# Patient Record
Sex: MALE | Race: WHITE | Employment: OTHER | URBAN - METROPOLITAN AREA
[De-identification: names, ages, dates, MRNs, and addresses within clinical notes are randomized per-mention and may not be internally consistent; named-entity substitution may affect disease eponyms.]

---

## 2019-07-30 ENCOUNTER — TRANSFERRED RECORDS (OUTPATIENT)
Dept: HEALTH INFORMATION MANAGEMENT | Facility: CLINIC | Age: 74
End: 2019-07-30

## 2019-09-10 ENCOUNTER — TRANSFERRED RECORDS (OUTPATIENT)
Dept: HEALTH INFORMATION MANAGEMENT | Facility: CLINIC | Age: 74
End: 2019-09-10

## 2019-09-10 ENCOUNTER — MEDICAL CORRESPONDENCE (OUTPATIENT)
Dept: HEALTH INFORMATION MANAGEMENT | Facility: CLINIC | Age: 74
End: 2019-09-10

## 2019-09-12 ENCOUNTER — HOSPITAL ENCOUNTER (OUTPATIENT)
Facility: CLINIC | Age: 74
Setting detail: SPECIMEN
Discharge: HOME | End: 2019-09-12
Attending: INTERNAL MEDICINE | Admitting: INTERNAL MEDICINE
Payer: COMMERCIAL

## 2019-09-12 DIAGNOSIS — C25.9 PANCREATIC CANCER (H): Primary | ICD-10-CM

## 2019-09-12 DIAGNOSIS — C25.9 PANCREATIC CANCER (H): ICD-10-CM

## 2019-09-12 LAB
ABO + RH BLD: NORMAL
ABO + RH BLD: NORMAL
BLD GP AB SCN SERPL QL: NORMAL
BLD PROD TYP BPU: NORMAL
BLOOD BANK CMNT PATIENT-IMP: NORMAL
NUM BPU REQUESTED: 1
SPECIMEN EXP DATE BLD: NORMAL

## 2019-09-12 PROCEDURE — 36415 COLL VENOUS BLD VENIPUNCTURE: CPT

## 2019-09-12 PROCEDURE — 86850 RBC ANTIBODY SCREEN: CPT | Performed by: INTERNAL MEDICINE

## 2019-09-12 PROCEDURE — 86901 BLOOD TYPING SEROLOGIC RH(D): CPT | Performed by: INTERNAL MEDICINE

## 2019-09-12 PROCEDURE — 86900 BLOOD TYPING SEROLOGIC ABO: CPT | Performed by: INTERNAL MEDICINE

## 2019-09-12 PROCEDURE — 86923 COMPATIBILITY TEST ELECTRIC: CPT | Performed by: INTERNAL MEDICINE

## 2019-09-12 RX ORDER — HEPARIN SODIUM,PORCINE 10 UNIT/ML
5 VIAL (ML) INTRAVENOUS
Status: CANCELLED | OUTPATIENT
Start: 2019-09-12

## 2019-09-12 RX ORDER — HEPARIN SODIUM (PORCINE) LOCK FLUSH IV SOLN 100 UNIT/ML 100 UNIT/ML
5 SOLUTION INTRAVENOUS
Status: CANCELLED | OUTPATIENT
Start: 2019-09-12

## 2019-09-12 NOTE — PROGRESS NOTES
Nursing Note:  Rhett Nation presents today for labs.    Patient seen by provider today: No   present during visit today: Not Applicable.    Note: N/A.    Intravenous Access:  Labs drawn without difficulty.    Discharge Plan:   Patient was sent home.  Will RTC tomorrow for PRBC     Yvette Hahn RN, RN

## 2019-09-13 ENCOUNTER — HOSPITAL ENCOUNTER (OUTPATIENT)
Facility: CLINIC | Age: 74
Setting detail: SPECIMEN
End: 2019-09-13
Attending: INTERNAL MEDICINE
Payer: COMMERCIAL

## 2019-09-13 ENCOUNTER — INFUSION THERAPY VISIT (OUTPATIENT)
Dept: INFUSION THERAPY | Facility: CLINIC | Age: 74
End: 2019-09-13
Attending: INTERNAL MEDICINE
Payer: COMMERCIAL

## 2019-09-13 VITALS
OXYGEN SATURATION: 99 % | SYSTOLIC BLOOD PRESSURE: 150 MMHG | HEART RATE: 70 BPM | RESPIRATION RATE: 16 BRPM | TEMPERATURE: 97 F | DIASTOLIC BLOOD PRESSURE: 74 MMHG

## 2019-09-13 DIAGNOSIS — C25.9 PANCREATIC CANCER (H): Primary | ICD-10-CM

## 2019-09-13 LAB
BLD PROD TYP BPU: NORMAL
BLD UNIT ID BPU: 0
BLOOD PRODUCT CODE: NORMAL
BPU ID: NORMAL
TRANSFUSION STATUS PATIENT QL: NORMAL
TRANSFUSION STATUS PATIENT QL: NORMAL

## 2019-09-13 PROCEDURE — P9016 RBC LEUKOCYTES REDUCED: HCPCS

## 2019-09-13 PROCEDURE — 36430 TRANSFUSION BLD/BLD COMPNT: CPT

## 2019-09-13 NOTE — PROGRESS NOTES
Infusion Nursing Note:  Rhett UMAÑA Rios presents today for PRBC.    Patient seen by provider today: No   present during visit today: Not Applicable.    Note: Blood consent signed and to be scanned.    Intravenous Access:  Peripheral IV placed.    Treatment Conditions:  Blood transfusion consent signed 9/13/19.      Post Infusion Assessment:  Patient tolerated infusion without incident.  Blood return noted pre and post infusion.  Site patent and intact, free from redness, edema or discomfort.  No evidence of extravasations.  Access discontinued per protocol.       Discharge Plan:   Patient declined prescription refills.  Discharge instructions reviewed with: Patient.  Patient and/or family verbalized understanding of discharge instructions and all questions answered.  Copy of AVS reviewed with patient and/or family.  Patient will return PRN for next appointment.  Patient discharged in stable condition accompanied by: wife.  Departure Mode: Ambulatory.    Mere Weaver, RN, RN

## 2019-10-21 ENCOUNTER — TRANSFERRED RECORDS (OUTPATIENT)
Dept: HEALTH INFORMATION MANAGEMENT | Facility: CLINIC | Age: 74
End: 2019-10-21

## 2019-10-21 ENCOUNTER — MEDICAL CORRESPONDENCE (OUTPATIENT)
Dept: HEALTH INFORMATION MANAGEMENT | Facility: CLINIC | Age: 74
End: 2019-10-21

## 2019-10-21 ENCOUNTER — HOSPITAL ENCOUNTER (OUTPATIENT)
Dept: LAB | Facility: CLINIC | Age: 74
Discharge: HOME | End: 2019-10-21
Attending: NURSE PRACTITIONER | Admitting: NURSE PRACTITIONER
Payer: COMMERCIAL

## 2019-10-21 DIAGNOSIS — C25.9 PANCREATIC CANCER (H): ICD-10-CM

## 2019-10-21 LAB
ABO + RH BLD: NORMAL
ABO + RH BLD: NORMAL
AST SERPL-CCNC: 17 U/L (ref 10–40)
BLD GP AB SCN SERPL QL: NORMAL
BLD PROD TYP BPU: NORMAL
BLOOD BANK CMNT PATIENT-IMP: NORMAL
CREAT SERPL-MCNC: 1.2 MG/DL (ref 0.73–1.18)
GFR SERPL CREATININE-BSD FRML MDRD: 58 ML/MIN/1.73M2
NUM BPU REQUESTED: 2
POTASSIUM SERPL-SCNC: 3.5 MMOL/L (ref 3.5–5.1)
SPECIMEN EXP DATE BLD: NORMAL

## 2019-10-21 PROCEDURE — 86923 COMPATIBILITY TEST ELECTRIC: CPT | Performed by: NURSE PRACTITIONER

## 2019-10-21 PROCEDURE — 86900 BLOOD TYPING SEROLOGIC ABO: CPT | Performed by: NURSE PRACTITIONER

## 2019-10-21 PROCEDURE — 86850 RBC ANTIBODY SCREEN: CPT | Performed by: NURSE PRACTITIONER

## 2019-10-21 PROCEDURE — 36415 COLL VENOUS BLD VENIPUNCTURE: CPT | Performed by: NURSE PRACTITIONER

## 2019-10-21 PROCEDURE — 86901 BLOOD TYPING SEROLOGIC RH(D): CPT | Performed by: NURSE PRACTITIONER

## 2019-10-21 RX ORDER — HEPARIN SODIUM (PORCINE) LOCK FLUSH IV SOLN 100 UNIT/ML 100 UNIT/ML
5 SOLUTION INTRAVENOUS
Status: CANCELLED | OUTPATIENT
Start: 2019-10-21

## 2019-10-21 RX ORDER — HEPARIN SODIUM,PORCINE 10 UNIT/ML
5 VIAL (ML) INTRAVENOUS
Status: CANCELLED | OUTPATIENT
Start: 2019-10-21

## 2019-10-22 ENCOUNTER — INFUSION THERAPY VISIT (OUTPATIENT)
Dept: INFUSION THERAPY | Facility: CLINIC | Age: 74
End: 2019-10-22
Attending: NURSE PRACTITIONER
Payer: COMMERCIAL

## 2019-10-22 VITALS
HEART RATE: 64 BPM | DIASTOLIC BLOOD PRESSURE: 64 MMHG | RESPIRATION RATE: 16 BRPM | TEMPERATURE: 97.8 F | OXYGEN SATURATION: 99 % | SYSTOLIC BLOOD PRESSURE: 131 MMHG

## 2019-10-22 DIAGNOSIS — C25.9 PANCREATIC CANCER (H): Primary | ICD-10-CM

## 2019-10-22 LAB
BLD PROD TYP BPU: NORMAL
BLD PROD TYP BPU: NORMAL
BLD UNIT ID BPU: 0
BLD UNIT ID BPU: 0
BLOOD PRODUCT CODE: NORMAL
BLOOD PRODUCT CODE: NORMAL
BPU ID: NORMAL
BPU ID: NORMAL
TRANSFUSION STATUS PATIENT QL: NORMAL

## 2019-10-22 PROCEDURE — P9016 RBC LEUKOCYTES REDUCED: HCPCS | Performed by: NURSE PRACTITIONER

## 2019-10-22 PROCEDURE — 36430 TRANSFUSION BLD/BLD COMPNT: CPT

## 2019-10-22 NOTE — PROGRESS NOTES
Infusion Nursing Note:  Rhett Nation presents today for 2 units of PRBC's.    Patient seen by provider today: No   present during visit today: Not Applicable.    Note: N/A.    Intravenous Access:  Peripheral IV placed.    Treatment Conditions:  Hemoglobin 7.1 on 10/21/19.      Post Infusion Assessment:  Patient tolerated infusion without incident.  Blood return noted pre and post infusion.  Site patent and intact, free from redness, edema or discomfort.  No evidence of extravasations.  Access discontinued per protocol.       Discharge Plan:   Discharge instructions reviewed with: Patient and Family.  Patient and/or family verbalized understanding of discharge instructions and all questions answered.  Patient discharged in stable condition accompanied by: wife.  Departure Mode: Ambulatory.    Cait Hicks RN

## 2019-10-29 ENCOUNTER — HOSPITAL ENCOUNTER (INPATIENT)
Facility: CLINIC | Age: 74
LOS: 2 days | Discharge: HOME | DRG: 378 | End: 2019-10-31
Attending: EMERGENCY MEDICINE | Admitting: INTERNAL MEDICINE
Payer: COMMERCIAL

## 2019-10-29 ENCOUNTER — APPOINTMENT (OUTPATIENT)
Dept: GENERAL RADIOLOGY | Facility: CLINIC | Age: 74
DRG: 378 | End: 2019-10-29
Attending: EMERGENCY MEDICINE
Payer: COMMERCIAL

## 2019-10-29 DIAGNOSIS — D72.829 LEUKOCYTOSIS, UNSPECIFIED TYPE: ICD-10-CM

## 2019-10-29 DIAGNOSIS — D69.6 THROMBOCYTOPENIA (H): ICD-10-CM

## 2019-10-29 DIAGNOSIS — K92.2 GASTROINTESTINAL HEMORRHAGE, UNSPECIFIED GASTROINTESTINAL HEMORRHAGE TYPE: ICD-10-CM

## 2019-10-29 DIAGNOSIS — D64.9 ANEMIA, UNSPECIFIED TYPE: ICD-10-CM

## 2019-10-29 DIAGNOSIS — C25.9 MALIGNANT NEOPLASM OF PANCREAS, UNSPECIFIED LOCATION OF MALIGNANCY (H): ICD-10-CM

## 2019-10-29 DIAGNOSIS — K92.1 MELENA: ICD-10-CM

## 2019-10-29 LAB
ALBUMIN SERPL-MCNC: 2.5 G/DL (ref 3.4–5)
ALP SERPL-CCNC: 153 U/L (ref 40–150)
ALT SERPL W P-5'-P-CCNC: 28 U/L (ref 0–70)
ANION GAP SERPL CALCULATED.3IONS-SCNC: 7 MMOL/L (ref 3–14)
AST SERPL W P-5'-P-CCNC: 21 U/L (ref 0–45)
BASOPHILS # BLD AUTO: 0.1 10E9/L (ref 0–0.2)
BASOPHILS NFR BLD AUTO: 0.2 %
BILIRUB SERPL-MCNC: 0.5 MG/DL (ref 0.2–1.3)
BLD PROD TYP BPU: NORMAL
BLD PROD TYP BPU: NORMAL
BLD UNIT ID BPU: 0
BLD UNIT ID BPU: 0
BLOOD PRODUCT CODE: NORMAL
BLOOD PRODUCT CODE: NORMAL
BPU ID: NORMAL
BPU ID: NORMAL
BUN SERPL-MCNC: 43 MG/DL (ref 7–30)
CALCIUM SERPL-MCNC: 8.1 MG/DL (ref 8.5–10.1)
CHLORIDE SERPL-SCNC: 110 MMOL/L (ref 94–109)
CO2 SERPL-SCNC: 27 MMOL/L (ref 20–32)
CREAT SERPL-MCNC: 0.99 MG/DL (ref 0.66–1.25)
DIFFERENTIAL METHOD BLD: ABNORMAL
EOSINOPHIL # BLD AUTO: 0.1 10E9/L (ref 0–0.7)
EOSINOPHIL NFR BLD AUTO: 0.2 %
ERYTHROCYTE [DISTWIDTH] IN BLOOD BY AUTOMATED COUNT: 14.6 % (ref 10–15)
GFR SERPL CREATININE-BSD FRML MDRD: 74 ML/MIN/{1.73_M2}
GLUCOSE BLDC GLUCOMTR-MCNC: 163 MG/DL (ref 70–99)
GLUCOSE BLDC GLUCOMTR-MCNC: 178 MG/DL (ref 70–99)
GLUCOSE SERPL-MCNC: 207 MG/DL (ref 70–99)
HCT VFR BLD AUTO: 18.3 % (ref 40–53)
HEMOCCULT STL QL: POSITIVE
HGB BLD-MCNC: 5.7 G/DL (ref 13.3–17.7)
HGB BLD-MCNC: 5.9 G/DL (ref 13.3–17.7)
IMM GRANULOCYTES # BLD: 0.9 10E9/L (ref 0–0.4)
IMM GRANULOCYTES NFR BLD: 3.5 %
INR PPP: 1.27 (ref 0.86–1.14)
LACTATE BLD-SCNC: 1.3 MMOL/L (ref 0.7–2)
LACTATE BLD-SCNC: 3 MMOL/L (ref 0.7–2)
LACTATE BLD-SCNC: 4.6 MMOL/L (ref 0.7–2)
LYMPHOCYTES # BLD AUTO: 2.3 10E9/L (ref 0.8–5.3)
LYMPHOCYTES NFR BLD AUTO: 9.1 %
MCH RBC QN AUTO: 30 PG (ref 26.5–33)
MCHC RBC AUTO-ENTMCNC: 31.1 G/DL (ref 31.5–36.5)
MCV RBC AUTO: 96 FL (ref 78–100)
MONOCYTES # BLD AUTO: 1.2 10E9/L (ref 0–1.3)
MONOCYTES NFR BLD AUTO: 5 %
NEUTROPHILS # BLD AUTO: 20.3 10E9/L (ref 1.6–8.3)
NEUTROPHILS NFR BLD AUTO: 82 %
NRBC # BLD AUTO: 0.1 10*3/UL
NRBC BLD AUTO-RTO: 0 /100
PLATELET # BLD AUTO: 91 10E9/L (ref 150–450)
POTASSIUM SERPL-SCNC: 4 MMOL/L (ref 3.4–5.3)
PROT SERPL-MCNC: 5.6 G/DL (ref 6.8–8.8)
RBC # BLD AUTO: 1.9 10E12/L (ref 4.4–5.9)
SODIUM SERPL-SCNC: 144 MMOL/L (ref 133–144)
TRANSFUSION STATUS PATIENT QL: NORMAL
TROPONIN I SERPL-MCNC: <0.015 UG/L (ref 0–0.04)
WBC # BLD AUTO: 24.8 10E9/L (ref 4–11)

## 2019-10-29 PROCEDURE — 96365 THER/PROPH/DIAG IV INF INIT: CPT

## 2019-10-29 PROCEDURE — 93005 ELECTROCARDIOGRAM TRACING: CPT

## 2019-10-29 PROCEDURE — 84484 ASSAY OF TROPONIN QUANT: CPT | Performed by: EMERGENCY MEDICINE

## 2019-10-29 PROCEDURE — 87040 BLOOD CULTURE FOR BACTERIA: CPT | Performed by: EMERGENCY MEDICINE

## 2019-10-29 PROCEDURE — 99223 1ST HOSP IP/OBS HIGH 75: CPT | Mod: AI | Performed by: INTERNAL MEDICINE

## 2019-10-29 PROCEDURE — 99207 ZZC CDG-CHARGE REQUIRED MANUAL ENTRY: CPT | Performed by: INTERNAL MEDICINE

## 2019-10-29 PROCEDURE — 83605 ASSAY OF LACTIC ACID: CPT | Performed by: PHYSICIAN ASSISTANT

## 2019-10-29 PROCEDURE — 96361 HYDRATE IV INFUSION ADD-ON: CPT

## 2019-10-29 PROCEDURE — 85018 HEMOGLOBIN: CPT | Performed by: PHYSICIAN ASSISTANT

## 2019-10-29 PROCEDURE — 96375 TX/PRO/DX INJ NEW DRUG ADDON: CPT

## 2019-10-29 PROCEDURE — 96360 HYDRATION IV INFUSION INIT: CPT | Mod: 59

## 2019-10-29 PROCEDURE — 85610 PROTHROMBIN TIME: CPT | Performed by: EMERGENCY MEDICINE

## 2019-10-29 PROCEDURE — 86850 RBC ANTIBODY SCREEN: CPT | Performed by: EMERGENCY MEDICINE

## 2019-10-29 PROCEDURE — 80053 COMPREHEN METABOLIC PANEL: CPT | Performed by: EMERGENCY MEDICINE

## 2019-10-29 PROCEDURE — P9016 RBC LEUKOCYTES REDUCED: HCPCS | Performed by: EMERGENCY MEDICINE

## 2019-10-29 PROCEDURE — 00000146 ZZHCL STATISTIC GLUCOSE BY METER IP

## 2019-10-29 PROCEDURE — 25000132 ZZH RX MED GY IP 250 OP 250 PS 637: Performed by: PHYSICIAN ASSISTANT

## 2019-10-29 PROCEDURE — 85025 COMPLETE CBC W/AUTO DIFF WBC: CPT | Performed by: EMERGENCY MEDICINE

## 2019-10-29 PROCEDURE — 83605 ASSAY OF LACTIC ACID: CPT | Performed by: EMERGENCY MEDICINE

## 2019-10-29 PROCEDURE — 12000000 ZZH R&B MED SURG/OB

## 2019-10-29 PROCEDURE — 86901 BLOOD TYPING SEROLOGIC RH(D): CPT | Performed by: EMERGENCY MEDICINE

## 2019-10-29 PROCEDURE — 86923 COMPATIBILITY TEST ELECTRIC: CPT | Performed by: EMERGENCY MEDICINE

## 2019-10-29 PROCEDURE — 25000128 H RX IP 250 OP 636: Performed by: PHYSICIAN ASSISTANT

## 2019-10-29 PROCEDURE — 71045 X-RAY EXAM CHEST 1 VIEW: CPT

## 2019-10-29 PROCEDURE — C9113 INJ PANTOPRAZOLE SODIUM, VIA: HCPCS | Performed by: EMERGENCY MEDICINE

## 2019-10-29 PROCEDURE — 25000131 ZZH RX MED GY IP 250 OP 636 PS 637: Performed by: PHYSICIAN ASSISTANT

## 2019-10-29 PROCEDURE — 99207 ZZC APP CREDIT; MD BILLING SHARED VISIT: CPT | Performed by: PHYSICIAN ASSISTANT

## 2019-10-29 PROCEDURE — 82272 OCCULT BLD FECES 1-3 TESTS: CPT | Performed by: EMERGENCY MEDICINE

## 2019-10-29 PROCEDURE — 25800030 ZZH RX IP 258 OP 636: Performed by: EMERGENCY MEDICINE

## 2019-10-29 PROCEDURE — 25000128 H RX IP 250 OP 636: Performed by: EMERGENCY MEDICINE

## 2019-10-29 PROCEDURE — 25800030 ZZH RX IP 258 OP 636: Performed by: PHYSICIAN ASSISTANT

## 2019-10-29 PROCEDURE — C9113 INJ PANTOPRAZOLE SODIUM, VIA: HCPCS | Performed by: PHYSICIAN ASSISTANT

## 2019-10-29 PROCEDURE — 36430 TRANSFUSION BLD/BLD COMPNT: CPT

## 2019-10-29 PROCEDURE — 99285 EMERGENCY DEPT VISIT HI MDM: CPT | Mod: 25

## 2019-10-29 PROCEDURE — 86900 BLOOD TYPING SEROLOGIC ABO: CPT | Performed by: EMERGENCY MEDICINE

## 2019-10-29 PROCEDURE — 36415 COLL VENOUS BLD VENIPUNCTURE: CPT | Performed by: PHYSICIAN ASSISTANT

## 2019-10-29 RX ORDER — AMPICILLIN AND SULBACTAM 2; 1 G/1; G/1
3 INJECTION, POWDER, FOR SOLUTION INTRAMUSCULAR; INTRAVENOUS ONCE
Status: DISCONTINUED | OUTPATIENT
Start: 2019-10-29 | End: 2019-10-29

## 2019-10-29 RX ORDER — PROCHLORPERAZINE 25 MG
12.5 SUPPOSITORY, RECTAL RECTAL EVERY 12 HOURS PRN
Status: DISCONTINUED | OUTPATIENT
Start: 2019-10-29 | End: 2019-10-31 | Stop reason: HOSPADM

## 2019-10-29 RX ORDER — GABAPENTIN 100 MG/1
300 CAPSULE ORAL 3 TIMES DAILY
COMMUNITY

## 2019-10-29 RX ORDER — LIDOCAINE/PRILOCAINE 2.5 %-2.5%
CREAM (GRAM) TOPICAL PRN
COMMUNITY

## 2019-10-29 RX ORDER — SODIUM CHLORIDE, SODIUM LACTATE, POTASSIUM CHLORIDE, CALCIUM CHLORIDE 600; 310; 30; 20 MG/100ML; MG/100ML; MG/100ML; MG/100ML
1000 INJECTION, SOLUTION INTRAVENOUS CONTINUOUS
Status: DISCONTINUED | OUTPATIENT
Start: 2019-10-29 | End: 2019-10-30

## 2019-10-29 RX ORDER — ASPIRIN 81 MG/1
81 TABLET ORAL DAILY
Status: ON HOLD | COMMUNITY
End: 2019-10-30

## 2019-10-29 RX ORDER — ATORVASTATIN CALCIUM 20 MG/1
20 TABLET, FILM COATED ORAL DAILY
COMMUNITY

## 2019-10-29 RX ORDER — DEXTROSE MONOHYDRATE 25 G/50ML
25-50 INJECTION, SOLUTION INTRAVENOUS
Status: DISCONTINUED | OUTPATIENT
Start: 2019-10-29 | End: 2019-10-31 | Stop reason: HOSPADM

## 2019-10-29 RX ORDER — LISINOPRIL 20 MG/1
20 TABLET ORAL DAILY
Status: ON HOLD | COMMUNITY
End: 2019-10-30

## 2019-10-29 RX ORDER — GLIPIZIDE 10 MG/1
10 TABLET, FILM COATED, EXTENDED RELEASE ORAL DAILY
Status: ON HOLD | COMMUNITY
End: 2019-10-30

## 2019-10-29 RX ORDER — LIDOCAINE 40 MG/G
CREAM TOPICAL DAILY PRN
Status: DISCONTINUED | OUTPATIENT
Start: 2019-10-29 | End: 2019-10-31 | Stop reason: HOSPADM

## 2019-10-29 RX ORDER — GABAPENTIN 300 MG/1
300 CAPSULE ORAL 3 TIMES DAILY
Status: DISCONTINUED | OUTPATIENT
Start: 2019-10-29 | End: 2019-10-31 | Stop reason: HOSPADM

## 2019-10-29 RX ORDER — NALOXONE HYDROCHLORIDE 0.4 MG/ML
.1-.4 INJECTION, SOLUTION INTRAMUSCULAR; INTRAVENOUS; SUBCUTANEOUS
Status: DISCONTINUED | OUTPATIENT
Start: 2019-10-29 | End: 2019-10-31 | Stop reason: HOSPADM

## 2019-10-29 RX ORDER — LIDOCAINE 40 MG/G
CREAM TOPICAL
Status: DISCONTINUED | OUTPATIENT
Start: 2019-10-29 | End: 2019-10-31 | Stop reason: HOSPADM

## 2019-10-29 RX ORDER — FUROSEMIDE 20 MG
20 TABLET ORAL DAILY
COMMUNITY

## 2019-10-29 RX ORDER — SODIUM CHLORIDE, SODIUM LACTATE, POTASSIUM CHLORIDE, CALCIUM CHLORIDE 600; 310; 30; 20 MG/100ML; MG/100ML; MG/100ML; MG/100ML
1000 INJECTION, SOLUTION INTRAVENOUS CONTINUOUS
Status: DISCONTINUED | OUTPATIENT
Start: 2019-10-29 | End: 2019-10-29

## 2019-10-29 RX ORDER — LISINOPRIL AND HYDROCHLOROTHIAZIDE 12.5; 2 MG/1; MG/1
1 TABLET ORAL DAILY
Status: ON HOLD | COMMUNITY
End: 2019-10-30

## 2019-10-29 RX ORDER — AMLODIPINE BESYLATE 5 MG/1
5 TABLET ORAL DAILY
Status: ON HOLD | COMMUNITY
End: 2019-10-30

## 2019-10-29 RX ORDER — NICOTINE POLACRILEX 4 MG
15-30 LOZENGE BUCCAL
Status: DISCONTINUED | OUTPATIENT
Start: 2019-10-29 | End: 2019-10-31 | Stop reason: HOSPADM

## 2019-10-29 RX ORDER — LISINOPRIL 20 MG/1
20 TABLET ORAL DAILY
Status: DISCONTINUED | OUTPATIENT
Start: 2019-10-29 | End: 2019-10-29

## 2019-10-29 RX ORDER — ATORVASTATIN CALCIUM 20 MG/1
20 TABLET, FILM COATED ORAL DAILY
Status: DISCONTINUED | OUTPATIENT
Start: 2019-10-29 | End: 2019-10-31 | Stop reason: HOSPADM

## 2019-10-29 RX ORDER — ONDANSETRON 2 MG/ML
4 INJECTION INTRAMUSCULAR; INTRAVENOUS EVERY 6 HOURS PRN
Status: DISCONTINUED | OUTPATIENT
Start: 2019-10-29 | End: 2019-10-31 | Stop reason: HOSPADM

## 2019-10-29 RX ORDER — ATENOLOL 100 MG/1
100 TABLET ORAL DAILY
COMMUNITY

## 2019-10-29 RX ORDER — ONDANSETRON 4 MG/1
4 TABLET, ORALLY DISINTEGRATING ORAL EVERY 6 HOURS PRN
Status: DISCONTINUED | OUTPATIENT
Start: 2019-10-29 | End: 2019-10-31 | Stop reason: HOSPADM

## 2019-10-29 RX ORDER — SODIUM CHLORIDE, SODIUM LACTATE, POTASSIUM CHLORIDE, CALCIUM CHLORIDE 600; 310; 30; 20 MG/100ML; MG/100ML; MG/100ML; MG/100ML
INJECTION, SOLUTION INTRAVENOUS CONTINUOUS
Status: DISCONTINUED | OUTPATIENT
Start: 2019-10-29 | End: 2019-10-29

## 2019-10-29 RX ORDER — FUROSEMIDE 20 MG
20 TABLET ORAL DAILY PRN
COMMUNITY

## 2019-10-29 RX ORDER — PROCHLORPERAZINE MALEATE 5 MG
5 TABLET ORAL EVERY 6 HOURS PRN
Status: DISCONTINUED | OUTPATIENT
Start: 2019-10-29 | End: 2019-10-31 | Stop reason: HOSPADM

## 2019-10-29 RX ADMIN — AMPICILLIN SODIUM AND SULBACTAM SODIUM 3 G: 2; 1 INJECTION, POWDER, FOR SOLUTION INTRAMUSCULAR; INTRAVENOUS at 15:08

## 2019-10-29 RX ADMIN — PANTOPRAZOLE SODIUM 40 MG: 40 INJECTION, POWDER, FOR SOLUTION INTRAVENOUS at 12:41

## 2019-10-29 RX ADMIN — INSULIN ASPART 1 UNITS: 100 INJECTION, SOLUTION INTRAVENOUS; SUBCUTANEOUS at 21:46

## 2019-10-29 RX ADMIN — SODIUM CHLORIDE 1000 ML: 9 INJECTION, SOLUTION INTRAVENOUS at 11:41

## 2019-10-29 RX ADMIN — INSULIN ASPART 1 UNITS: 100 INJECTION, SOLUTION INTRAVENOUS; SUBCUTANEOUS at 18:48

## 2019-10-29 RX ADMIN — SODIUM CHLORIDE, POTASSIUM CHLORIDE, SODIUM LACTATE AND CALCIUM CHLORIDE 1000 ML: 600; 310; 30; 20 INJECTION, SOLUTION INTRAVENOUS at 15:08

## 2019-10-29 RX ADMIN — ATORVASTATIN CALCIUM 20 MG: 20 TABLET, FILM COATED ORAL at 17:54

## 2019-10-29 RX ADMIN — SODIUM CHLORIDE, POTASSIUM CHLORIDE, SODIUM LACTATE AND CALCIUM CHLORIDE 1000 ML: 600; 310; 30; 20 INJECTION, SOLUTION INTRAVENOUS at 14:05

## 2019-10-29 RX ADMIN — GABAPENTIN 300 MG: 300 CAPSULE ORAL at 21:45

## 2019-10-29 RX ADMIN — GABAPENTIN 300 MG: 300 CAPSULE ORAL at 18:52

## 2019-10-29 RX ADMIN — SODIUM CHLORIDE, POTASSIUM CHLORIDE, SODIUM LACTATE AND CALCIUM CHLORIDE 1000 ML: 600; 310; 30; 20 INJECTION, SOLUTION INTRAVENOUS at 17:44

## 2019-10-29 RX ADMIN — PANTOPRAZOLE SODIUM 8 MG/HR: 40 INJECTION, POWDER, FOR SOLUTION INTRAVENOUS at 19:03

## 2019-10-29 ASSESSMENT — ENCOUNTER SYMPTOMS
ABDOMINAL PAIN: 0
LIGHT-HEADEDNESS: 1
ROS GI COMMENTS: BLOOD IN VOMIT
BLOOD IN STOOL: 1
SHORTNESS OF BREATH: 1

## 2019-10-29 ASSESSMENT — ACTIVITIES OF DAILY LIVING (ADL): ADLS_ACUITY_SCORE: 21

## 2019-10-29 NOTE — H&P
[facility]    History and Physical  Hospitalist       Date of Admission:  10/29/2019    Assessment & Plan   Rhett Nation is a 74 year old male with known history of pancreatic adenocarcinoma (relatively recent diagnosis 4/2019), insulin-dependent diabetes mellitus, hypertension, hyperlipidemia who presented after an episode of blood in his stool and emesis early this morning concerning for Upper GI bleed.    Spoke with Dr. Hoffman in the ED, full chart review including lab work, imaging, and vital signs were reviewed. Patient received 2 units PRBC in the ED, Dr. Hoffman spoke with GI who recommended consult.   The patient was admitted to inpatient status for further work-up and monitoring.      Melena  Patient symptomatic presents with episode of melena, recurred in ED.   Laboratory studies remarkable for BUN 43, concerning for upper GI bleed. The patient has an INR 1.5, is not on any blood thinners thought to be in the setting of poor nutritional status.  Most recent sigmoid endoscopy with colonic placement of endoscopic stent to relieve stricture 4/2019.  No prior history of GI bleeds, or excessive NSAID use.  -Continue transfusion of PRBCs with hemoglobin rechecks  -Appreciate consultation with GI  -Occult blood stool pending  -IV Protonix drip  -NPO status         Anemia, likely multifactorial in the setting of recent chemotherapy treatment and suspected GI bleeding  The patient recently underwent a round of chemotherapy with agents including Abraxane and Gemzar, this was on 10/21.  He received 2 units of blood on 10/22 for anemia induced by chemotherapy hemoglobin around this time 7.1. On admission today he was symptomatic, with a hemoglobin of 5.7  -Continue transfusion of PRBCs for Hgb <7 with hemoglobin rechecks  -Consider consultation with Hematology/Oncology in the future regarding recommendations         Elevated lactate differential includes stress, suspect dehydration versus sepsis  "  Patient is afebrile, however notes that he has been \"clammy feeling\" lately.  No active source of infection identified.  However with a white blood cell count of 24.8 and lactate of 4.6, further work-up was warranted.  Suspect elevated white count in part due to stress. chest x-ray negative for infiltrate.  Inspection of lower extremities revealed a small edematous ulcer on the medial aspect of his right lower extremity, however I do not suspect cellulitis component at this time. IV Unasyn started in the emergency department was discontinued.    -IV fluids  -Obtain urinalysis to rule out source of infection  -Blood culture x1 obtained, patient refused second  -Repeat Lactate after further fluid resuscitation      Hypoxia suspect etiology due to hemoglobin of 5.7  Patient does not have a history of pulmonary disease or heart failure. He is not on oxygen at home.  He does not have a productive cough or reported fevers. The patient denies chest pain. Chest x-ray obtained without infiltrate or effusion.  -Telemetry monitoring  -Supplemental oxygen as needed for saturations under 92%      History of pancreatic adenocarcinoma, deemed local advanced unresectable   This is a relatively new diagnosis for the patient as he was diagnosed in April 2019 with locally advanced unresectable pancreatic adenocarcinoma.  He follows with Dr. Moore in the [facility] system recently underwent a round of chemotherapy agents include Abraxane and Gemzar this was on 10/21.   -Consider oncology consult if warranted      History of insulin-dependent type 2 diabetes mellitus   The patient has been taking his own regimen of Tresiba based on his morning blood sugars which has been about 18 units in the morning.   -Hold oral metformin, Glipizide  -Given NPO status initiate sliding scale insulin       Chronic bilateral lower extremity edema   The patient has been evaluated with Doppler lower extremity ultrasound without DVT 07/2019.  Takes " "furosemide 20 mg as needed for the leg swelling.  There is 1 small ulceration on the right medial lower extremity did not does not appear to celulitic in appearnace at this time  -Outline area to monitor  -Consider PT orders for lymphedema   -Hold Lasix at this time given dehydrated status      History of hypertension   Hold home oral medications      History of hyperlipidemia   Resume Lipitor 20 mg once a day      DVT Prophylaxis: Pneumatic Compression Devices  Code Status: DNR / DNI  Expected discharge: 2 - 3 days, recommended to prior living arrangement once tolerating p.o., hemoglobin stable      Frida Dallas PA-C    Primary Care Physician   *Prince Alexander    Chief Complaint   Melena    History is obtained from the patient, chart review, patient's wife Melinda who was present for interview.       History of Present Illness   Rhett Nation is a 74 year old male with history of locally advanced unresectable pancreatic adenocarcinoma, insulin-dependent diabetes mellitus, hypertension, hyperlipidemia who presents with weakness and blood in his stool and emesis.   He was brought to the emergency department by his wife this morning after waking up with an episode of blood in his emesis and diarrhea in the bathroom this morning.  His vomit and diarrhea was dark in color described as \"blueberry like\".  The symptoms were not accompanied with abdominal pain. He notes that he was feeling clammy and chills. He has had diarrhea off and on since starting chemotherapy treatments.  Most recently 10/21 where he received a infusion of Abraxane and Gemzar.  He was told to take Imodium for his diarrhea, as this is a reaction with his chemotherapy.  Today he took Imodium which resolved the diarrhea.  Had not any further episodes of emesis.  He denies a history of GI bleeds.  He notes that he had an episode in the past month where there was a spot of red blood in his stool and he thought it was due to a hemorrhoid, but no " further episodes of this.  He has not had any vomiting other than the one episode early this morning that looked like it had blood in it.  He additionally notes that he has had a hard time swallowing and has a global sensation with eating since having the biopsy done on his pancreas in April 2019.  Denies epistaxis. He does report an overall low appetite.   He reports that this morning he has felt extremely weak and unsteady. He notes that he has been feeling more tired lately since his recent chemotherapy treatments however today he could hardly get to the couch from the bathroom.    He has felt short of breath due to feeling so weak lately.  Is vague about when this started.  Cannot specify any triggers, is not more short of breath when lying flat.  Has chronic baseline lower extremity edema, notes that his legs have not been any more swollen than usual.    He has had not not had any blood in his urine but notes that it has been darker in color.  Denies dysuria.  No new changes in diet, however he reports that he may have felt worse after having a bottle of repair last night.      No chest pain, syncope, dizziness. No vision changes.     Past Medical History    I have reviewed this patient's medical history and updated it with pertinent information if needed.     Past Surgical History   I have reviewed this patient's surgical history and updated it with pertinent information if needed.      Prior to Admission Medications   Prior to Admission Medications   Prescriptions Last Dose Informant Patient Reported? Taking?   aspirin 81 MG EC tablet 10/28/2019 at Unknown time  Yes Yes   Sig: Take 81 mg by mouth daily   glipiZIDE (GLUCOTROL XL) 10 MG 24 hr tablet 10/28/2019 at Unknown time  Yes Yes   Sig: Take 10 mg by mouth daily   metFORMIN (GLUCOPHAGE) 1000 MG tablet   Yes Yes   Sig: Take 1,000 mg by mouth 2 times daily (with meals)      Facility-Administered Medications: None     Allergies   No Known Allergies    Social  History   The patient is a retired .  He lives independently with his wife.    Family History   Reviewed, noncontributory    Review of Systems   The 10 point Review of Systems is negative other than noted in the HPI or here.     Physical Exam   Temp: 98.5  F (36.9  C) Temp src: Oral BP: 118/63 Pulse: 82 Heart Rate: 81 Resp: 17 SpO2: 100 % O2 Device: None (Room air)    Vital Signs with Ranges  Temp:  [98.4  F (36.9  C)-98.5  F (36.9  C)] 98.5  F (36.9  C)  Pulse:  [77-92] 82  Heart Rate:  [76-84] 81  Resp:  [10-20] 17  BP: (104-133)/(58-66) 118/63  SpO2:  [96 %-100 %] 100 %  0 lbs 0 oz    Constitutional: Awake, alert, cooperative, no apparent distress.  Eyes: Conjunctiva and pupils examined and normal.  HEENT: Dry mucous membranes, normal dentition.  Respiratory: Clear to auscultation bilaterally, no crackles or wheezing.  Cardiovascular: Regular rate and rhythm, normal S1 and S2, and no murmur noted.  GI: Soft, non-distended, non-tender, bowel sounds present.  Lymph/Hematologic: No anterior cervical or supraclavicular adenopathy.  Skin: Patient is significantly pale in color. Right-sided via her port in place without surrounding erythema or induration. No rashes.  Patient  Musculoskeletal: No joint swelling, erythema or tenderness.  Neurologic: Cranial nerves grossly 2-12 intact, normal strength and sensation. Moving all extremities.  Psychiatric: Alert, oriented to person, place and time, no obvious anxiety or depression.    Data   Data reviewed today:  I personally reviewed the EKG tracing showing Sinus rhythym without acute ST changes..  Recent Labs   Lab 10/29/19  1139   WBC 24.8*   HGB 5.7*   MCV 96   PLT 91*   INR 1.27*      POTASSIUM 4.0   CHLORIDE 110*   CO2 27   BUN 43*   CR 0.99   ANIONGAP 7   CLAIRE 8.1*   *   ALBUMIN 2.5*   PROTTOTAL 5.6*   BILITOTAL 0.5   ALKPHOS 153*   ALT 28   AST 21   TROPI <0.015       Recent Results (from the past 24 hour(s))   XR Chest Port 1 View    Narrative     CHEST ONE VIEW PORTABLE October 29, 2019 2:05 PM     HISTORY: Chest.    COMPARISON: None.      Impression    IMPRESSION: Right-sided Port-A-Cath. The lungs are clear. No  pneumothorax or pleural effusion.

## 2019-10-29 NOTE — ED NOTES
Patient refusing port to be accessed. Patient was offered numbing cream but is still refusing and would like a peripheral IV placed.

## 2019-10-29 NOTE — ED NOTES
[facility]  ED Nurse Handoff Report    Rhett Nation is a 74 year old male   ED Chief complaint: Hematemesis and Rectal Bleeding  . ED Diagnosis:   Final diagnoses:   Anemia, unspecified type     Allergies: No Known Allergies    Code Status: Not on file   Activity level - Baseline/Home:  Independent. Activity Level - Current:   Assist X 1. Lift room needed: No. Bariatric: No   Needed: No   Isolation: No. Infection: Not Applicable.     Vital Signs:   Vitals:    10/29/19 1415 10/29/19 1445 10/29/19 1500 10/29/19 1515   BP: (!) 142/76  (!) 146/72 (!) 144/76   Pulse: 81 102 85 84   Resp: 13 22  20   Temp:       TempSrc:       SpO2: 100% 100%  100%       Cardiac Rhythm:  ,      Pain level:    Patient confused: No. Patient Falls Risk: Yes.   Elimination Status: Has not voided yet   Patient Report - Initial Complaint: Hematemesis , rectal bleeding . Focused Assessment: Rhett Nation is a 74 year old male with a history of HTN, HLD, DM2, pancreatic cancer, and splenic vein thrombosis who presents to the emergency department for evaluation of blood in stool. The patient reports he went to bed last night feeling normal, and he woke up at 0200 passing dark and red blood in his stool. He further reports he had one episode of vomiting blood. The patient states he had chest pain, lightheadedness, and shortness of breath accompanying these symptoms. Upon presentation in the ED, the patient reports the chest pain has subsided, but he still feels short of breath has has become abnormally pale. He denies abdominal pain. The patient notes he has never experienced similar symptoms in the past. He indicates he is a current tobacco smoker. Of note, the patient received chemotherapy for pancreatic cancer on 10/21 and had a blood transfusion on 10/22 secondary to low hemoglobin levels. The patient also had a CT scan on 10/26, results below.   Patient was seen by oncology clinic Savanna Hayes on 10/21 and clinic  noted below.     CT Chest Abd Pelvis W IV Cont, 10/26:.  1. Questionable slight decrease in size of the pancreatic tail mass.  2. No definitive evidence of worsening metastatic disease.  3. Left lower lobe lung opacities are unchanged. Attention on follow-up.  4. Probable portal venous thrombosis within peripheral branches of the right portal vein appears similar.  5. Hypoattenuating lesion measuring approximately 2.0 cm right hepatic lobe is unchanged.  6. 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  As per radiology.     Savanna Hayes, APRN, CNP - 10/21/2019 10:30 AM CDT  Formatting of this note might be different from the original.  HEMATOLOGY/ONCOLOGY CLINICIAN VISIT    NAME: Rhett Nation  : 1945  MRN:[id number]   CSN: [id number]    DATE OF ENCOUNTER: 10/21/2019    REASON FOR VISIT: Re-initiated Abraxane/gemcitabine    PRIMARY ONCOLOGIST: Delfina Moore MD    DIAGNOSIS: Stage IIA, locally advanced unresectable pancreatic adenocarcinoma    ONCOLOGIC HISTORY:   In brief, Rhett nation is a 73-year-old male with recent diagnosis locally advanced pancreatic cancer, admitted to the hospital on 2019 with abdominal pain and anorexia.  Ultrasound on 2019 demonstrated pancreatic tail lesion and a follow-up CT demonstrated a 4.4 cm mass between the tail of the pancreas and splenic hilum causing high-grade obstruction of the splenic flexure of the colon.  He subsequently underwent an endoscopic ultrasound with FNA of the tail of the pancreas lesion which was consistent with adenocarcinoma.  Aspiration of ascitic fluid was negative for malignancy.  He declined colon resection and underwent a stent on 2019.  His mass was deemed inoperable and he began palliative chemotherapy with gemcitabine and Abraxane. His course has been complicated by significant weakness, fatigue, and diarrhea. His chemotherapy has been  hold, but given his rising CA 19 9, he presents today to reinitiate chemotherapy    INTERIM HISTORY:  Rhett presents today with his wife. He has states he has been in his usual state of health. Does have ongoing weakness, but feels better since recovering from chemotherapy. Prior while on chemotherapy, he he denied any nausea vomiting but did have significant fatigue, anorexia, and weakness, which did result in 1 fall. He was able to mow his lawn with a riding . He does have exertional dyspnea and has been slightly hypotensive, with a systolic blood pressure down to 100. He states that generally his weight has been stable. He has had intermittent dizziness upon getting up. His hemoglobin is down today at 7.1 and was last transfused with packed red blood cells in September 2019. He tolerated the transfusion well. Otherwise, he has been afebrile. He has had some intermittent chills at times. He denies any nausea or vomiting. He continues to have ongoing intermittent diarrhea, which is stable. He uses Imodium, which does resolve his symptoms. He denies any mucositis. He denies any abnormal bleeding or bruising tendencies.      11:38 Gastrointestinal Gastrointestinal - GI WDL: -WDL except; GI symptoms; nausea and vomiting; stool  Nausea/Vomiting Signs/Symptoms: emesis; nausea intermittent (Emesis is dark colored) Stool Color: black  GI Signs/Symptoms: nausea; vomiting  AD     11:38 Respiratory Respiratory - Respiratory WDL: -WDL except (Shortness of breath) AD     11:38 Musculoskeletal Musculoskeletal - General Mobility: generalized weakness  AD        Tests Performed: EKG, Blood Work. Abnormal Results:   Labs Ordered and Resulted from Time of ED Arrival Up to the Time of Departure from the ED   CBC WITH PLATELETS DIFFERENTIAL - Abnormal; Notable for the following components:       Result Value    WBC 24.8 (*)     RBC Count 1.90 (*)     Hemoglobin 5.7 (*)     Hematocrit 18.3 (*)     MCHC 31.1 (*)      Platelet Count 91 (*)     Absolute Neutrophil 20.3 (*)     Abs Immature Granulocytes 0.9 (*)     All other components within normal limits   INR - Abnormal; Notable for the following components:    INR 1.27 (*)     All other components within normal limits   COMPREHENSIVE METABOLIC PANEL - Abnormal; Notable for the following components:    Chloride 110 (*)     Glucose 207 (*)     Urea Nitrogen 43 (*)     Calcium 8.1 (*)     Albumin 2.5 (*)     Protein Total 5.6 (*)     Alkaline Phosphatase 153 (*)     All other components within normal limits   LACTIC ACID WHOLE BLOOD - Abnormal; Notable for the following components:    Lactic Acid 4.6 (*)     All other components within normal limits   OCCULT BLOOD STOOL - Abnormal; Notable for the following components:    Occult Blood Positive (*)     All other components within normal limits   LACTIC ACID WHOLE BLOOD - Abnormal; Notable for the following components:    Lactic Acid 3.0 (*)     All other components within normal limits   TROPONIN I   ROUTINE UA WITH MICROSCOPIC   PERIPHERAL IV CATHETER   ABO/RH TYPE AND SCREEN   RED BLOOD CELL PREPARE ORDER UNIT   BLOOD COMPONENT   BLOOD COMPONENT   BLOOD CULTURE   BLOOD CULTURE     No orders to display       Treatments provided: Blood Transfusion (will receive a total of 2 units of PRBC)  Family Comments: James Lawrence in room  OBS brochure/video discussed/provided to patient:  TBD  ED Medications:   Medications   lactated ringers infusion (1,000 mLs Intravenous New Bag 10/29/19 1508)   ampicillin-sulbactam (UNASYN) 3 g vial to attach to  mL bag (3 g Intravenous New Bag 10/29/19 1508)   0.9% sodium chloride BOLUS (0 mLs Intravenous Stopped 10/29/19 1324)   pantoprazole (PROTONIX) 40 mg IV push injection (40 mg Intravenous Given 10/29/19 1241)   lactated ringers BOLUS 1,000 mL (0 mLs Intravenous Stopped 10/29/19 1508)     Drips infusing:  Yes  For the majority of the shift, the patient's behavior Green. Interventions performed  were NA.     Severe Sepsis OR Septic Shock Diagnosis Present: Yes    Per the ED Provider, Time Zero for severe sepsis or septic shock is:  1201    3 Hour Severe Sepsis Bundle Completion:  1. Initial Lactic Acid Result:   Recent Labs   Lab Test 10/29/19  1404 10/29/19  1138   LACT 3.0* 4.6*     2. Blood Cultures before Antibiotics: Yes  3. Broad Spectrum Antibiotics Administered:     Anti-infectives (From now, onward)    Start     Dose/Rate Route Frequency Ordered Stop    10/29/19 1434  ampicillin-sulbactam (UNASYN) 3 g vial to attach to  mL bag      3 g  over 1 Hours Intravenous ONCE 10/29/19 1415          4. 2000 ml of IV fluids have been given so far      6 Hour Severe Sepsis Bundle Completion:    1. Repeat Lactic Acid Level:   Last result   Lab Results   Component Value Date    LACT 3.0 (H) 10/29/2019     2. Patient currently on Vasopressors =  No      ED Nurse Name/Phone Number: Alisson Del Real RN,   12:48 PM

## 2019-10-29 NOTE — ED TRIAGE NOTES
Patient presents to the ED reporting is vomiting blood and passing blood in his stool. States started at 0200 this morning. Reports is feeling dizzy and SOB as well.

## 2019-10-29 NOTE — PHARMACY-ADMISSION MEDICATION HISTORY
Admission medication history interview status for this patient is complete. See [facility] admission navigator for allergy information, prior to admission medications and immunization status.     Medication history interview source(s):Patient  Medication history resources (including written lists, pill bottles, clinic record): [facility] Everywhere  Primary pharmacy: [facility] [address] [place] [address]       Changes made to PTA medication list:  Added: None  Deleted: None  Changed: Gabapentin 300mg daily --> 300mg TID     Actions taken by pharmacist (provider contacted, etc):None     Additional medication history information: Patient is taking both lisinopril 20mg and lisinopril-hydrochlorothiazide 20-12.5 mg tablets but has been out of both medications for a few days.     Medication reconciliation/reorder completed by provider prior to medication history?  No     Do you take OTC medications (eg tylenol, ibuprofen, fish oil, eye/ear drops, etc)? Yes       Prior to Admission medications    Medication Sig Last Dose Taking? Auth Provider   amLODIPine (NORVASC) 5 MG tablet Take 5 mg by mouth daily  Yes Unknown, Entered By History   aspirin 81 MG EC tablet Take 81 mg by mouth daily 10/28/2019 at Unknown time Yes Unknown, Entered By History   atenolol (TENORMIN) 100 MG tablet Take 100 mg by mouth daily  Yes Unknown, Entered By History   atorvastatin (LIPITOR) 20 MG tablet Take 20 mg by mouth daily 10/28/2019 at Unknown time Yes Unknown, Entered By History   furosemide (LASIX) 20 MG tablet Take 20 mg by mouth daily 10/28/2019 at Unknown time Yes Unknown, Entered By History   furosemide (LASIX) 20 MG tablet Take 20 mg by mouth daily as needed Take at noon as needed for leg swelling  at prn Yes Unknown, Entered By History   gabapentin (NEURONTIN) 100 MG capsule Take 300 mg by mouth 3 times daily  Past Week at Unknown time Yes Unknown, Entered By History   glipiZIDE (GLUCOTROL XL) 10 MG 24 hr tablet  Take 10 mg by mouth daily 10/28/2019 at Unknown time Yes Unknown, Entered By History   insulin degludec (TRESIBA) 100 UNIT/ML pen Inject 18 Units Subcutaneous daily 10/28/2019 at am Yes Unknown, Entered By History   lidocaine-prilocaine (EMLA) 2.5-2.5 % external cream Apply topically as needed For port   at prn Yes Unknown, Entered By History   lisinopril (PRINIVIL/ZESTRIL) 20 MG tablet Take 20 mg by mouth daily Past Week at Unknown time Yes Unknown, Entered By History   lisinopril-hydrochlorothiazide (PRINZIDE/ZESTORETIC) 20-12.5 MG tablet Take 1 tablet by mouth daily Past Week at Unknown time Yes Unknown, Entered By History   metFORMIN (GLUCOPHAGE) 1000 MG tablet Take 1,000 mg by mouth daily with food  10/28/2019 at Unknown time Yes Unknown, Entered By History   UNABLE TO FIND MEDICATION NAME: anti-diarrheal medication to take as needed 10/29/2019 at Unknown time Yes Unknown, Entered By History   UNABLE TO FIND MEDICATION NAME: IV chemotherapy to be given every other week on Mondays. 10/21/2019 Yes Unknown, Entered By History

## 2019-10-29 NOTE — ED PROVIDER NOTES
History     Chief Complaint:  Blood in stool    HPI   Rhett Nation is a 74 year old male with a history of HTN, HLD, DM2, pancreatic cancer, and splenic vein thrombosis who presents to the emergency department for evaluation of blood in stool. The patient reports he went to bed last night feeling normal, and he woke up at 0200 passing dark and red blood in his stool. He further reports he had one episode of vomiting blood. The patient states he had chest pain, lightheadedness, and shortness of breath accompanying these symptoms. Upon presentation in the ED, the patient reports the chest pain has subsided, but he still feels short of breath has has become abnormally pale. He denies abdominal pain. The patient notes he has never experienced similar symptoms in the past. He indicates he is a current tobacco smoker. Of note, the patient received chemotherapy for pancreatic cancer on 10/21 and had a blood transfusion on 10/22 secondary to low hemoglobin levels. The patient also had a CT scan on 10/26, results below.   Patient was seen by oncology clinic Savanna Hayes on 10/21 and clinic noted below (pancreatic adenocarcinoma - recent diagnosis 4/2019).    CT Chest Abd Pelvis W IV Cont, 10/26:.  1. Questionable slight decrease in size of the pancreatic tail mass.  2. No definitive evidence of worsening metastatic disease.  3. Left lower lobe lung opacities are unchanged. Attention on follow-up.  4. Probable portal venous thrombosis within peripheral branches of the right portal vein appears similar.  5. Hypoattenuating lesion measuring approximately 2.0 cm right hepatic lobe is unchanged.  6. 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  As per radiology.    Savanna Hayes, LUPE, CNP - 10/21/2019 10:30 AM CDT  Formatting of this note might be different from the original.  HEMATOLOGY/ONCOLOGY CLINICIAN VISIT    NAME: Rhett UMAÑA  Rios  : 1945  MRN:[id number]   [facility]: [id number]    DATE OF ENCOUNTER: 10/21/2019    REASON FOR VISIT: Re-initiated Abraxane/gemcitabine    PRIMARY ONCOLOGIST: Delfina Moore MD    DIAGNOSIS: Stage IIA, locally advanced unresectable pancreatic adenocarcinoma    ONCOLOGIC HISTORY:   In brief, Rhett rothman is a 73-year-old male with recent diagnosis locally advanced pancreatic cancer, admitted to the hospital on 2019 with abdominal pain and anorexia.  Ultrasound on 2019 demonstrated pancreatic tail lesion and a follow-up CT demonstrated a 4.4 cm mass between the tail of the pancreas and splenic hilum causing high-grade obstruction of the splenic flexure of the colon.  He subsequently underwent an endoscopic ultrasound with FNA of the tail of the pancreas lesion which was consistent with adenocarcinoma.  Aspiration of ascitic fluid was negative for malignancy.  He declined colon resection and underwent a stent on 2019.  His mass was deemed inoperable and he began palliative chemotherapy with gemcitabine and Abraxane. His course has been complicated by significant weakness, fatigue, and diarrhea. His chemotherapy has been hold, but given his rising CA 19 9, he presents today to reinitiate chemotherapy    INTERIM HISTORY:  Rhett presents today with his wife. He has states he has been in his usual state of health. Does have ongoing weakness, but feels better since recovering from chemotherapy. Prior while on chemotherapy, he he denied any nausea vomiting but did have significant fatigue, anorexia, and weakness, which did result in 1 fall. He was able to mow his lawn with a riding . He does have exertional dyspnea and has been slightly hypotensive, with a systolic blood pressure down to 100. He states that generally his weight has been stable. He has had intermittent dizziness upon getting up. His hemoglobin is down today at 7.1 and was last transfused with packed red blood cells in  September 2019. He tolerated the transfusion well. Otherwise, he has been afebrile. He has had some intermittent chills at times. He denies any nausea or vomiting. He continues to have ongoing intermittent diarrhea, which is stable. He uses Imodium, which does resolve his symptoms. He denies any mucositis. He denies any abnormal bleeding or bruising tendencies.     Allergies:  NKDA     Medications:    Aspirin 81 MG  Insulin  Miralax  Zofran  Compazine  Ativan  Emla  Glucotrol  Glucophage  Lipitor  Lisinopril  Neurontin      Past Medical History:    DM2  HTN  Kidney stones  Pancreatic cancer, stage IIA as of 4/19/19 (pancreatic adenocarcinoma)  Nictoine use disorder   Splenic vein thrombosis  HLD  Lumbago  Cerebral artery occlusion  PSVT  Anemia associated with chemotherapy, 10/21/19  Colon stricture, stented 4/20/19    Past Surgical History:    Hand surgery  Bladder stone removal  Lithotripsy    Family History:    Heart disease   Esophageal cancer   Cirrhosis  Depression  HTN  Diabetes  Lung cancer   Cataract   Diabetes     Social History:  Presents with wife.   Current every day smoker, 1 ppd, 58 pack years.   Positive for alcohol use.    Marital Status:   [2]     Review of Systems   Respiratory: Positive for shortness of breath.    Cardiovascular: Positive for chest pain.   Gastrointestinal: Positive for blood in stool. Negative for abdominal pain.        Blood in vomit   Skin: Positive for pallor.   Neurological: Positive for light-headedness.   All other systems reviewed and are negative.    Patient presents to the ED reporting is vomiting blood and passing blood in his stool. States started at 0200 this morning. Reports is feeling dizzy and SOB as well.     SOB x weeks with no cough.  Dark black stools x 3-4x today with 1 episode of vomiting.    Physical Exam     Patient Vitals for the past 24 hrs:   BP Temp Temp src Pulse Heart Rate Resp SpO2   10/29/19 1515 (!) 144/76 -- -- 84 85 20 100 %   10/29/19  1500 (!) 146/72 -- -- 85 87 -- --   10/29/19 1445 -- -- -- 102 80 22 100 %   10/29/19 1415 (!) 142/76 -- -- 81 82 13 100 %   10/29/19 1400 96/65 -- -- 86 87 -- 100 %   10/29/19 1345 117/63 -- -- 81 82 12 100 %   10/29/19 1330 103/65 -- -- 82 81 14 98 %   10/29/19 1319 118/63 -- -- -- -- -- --   10/29/19 1318 125/62 98.5  F (36.9  C) Oral 82 81 17 100 %   10/29/19 1315 125/62 -- -- 82 80 10 97 %   10/29/19 1300 130/60 -- -- 79 80 16 100 %   10/29/19 1245 121/59 -- -- 81 79 13 100 %   10/29/19 1230 115/61 -- -- 77 76 11 99 %   10/29/19 1215 121/60 -- -- 78 78 14 96 %   10/29/19 1200 117/58 -- -- 80 80 10 98 %   10/29/19 1145 104/59 -- -- 80 80 15 98 %   10/29/19 1130 133/63 -- -- 85 84 20 100 %   10/29/19 1124 125/66 98.4  F (36.9  C) -- 92 -- 18 98 %      Physical Exam  GEN: patient alert and oriented, appears pale.  Wife at bedside.  HEAD: atraumatic, normocephalic  EYES: pupils reactive,  conjunctivae pale  ENT: TMs flat and white bilaterally, oropharynx normal with no erythema or exudate, mucus membranes dry  NECK: no cervical LAD, no meningeal signs  RESPIRATORY: no tachypnea, breath sounds clear to auscultation (no rales, wheezes, rhonchi), port a cath right chest wall, normal phonation  CVS: normal S1/S2, II/VI systolic ej murmurs, no rubs/gallops  ABDOMEN: soft, nontender, no masses or organomegaly, no rebound, decreased bowel sounds  BACK: no costovertebral angle tenderness  EXTREMITIES: intact pulses x 4, full range of motion at joints, 3plus edema of the feet bilaterally  MUSCULOSKELETAL: no deformities  SKIN: warm and dry, no acute rashes  NEURO: GCS 15, cranial nerves intact.  Motor- moves all 4 extremities.  Overall symmetrical exam  HEME: no bruising or petechiae/contusions  LYMPH: no lymphadenopathy  RECTAL: no external hemorrhoids    Emergency Department Course     ECG:  Time: 1130  Vent. Rate 86 bpm. ME interval 114. QRS duration 84. QT/QTc 398/476. P-R-T axis 10 23 66.  Sinus rhythm with premature  atrial complexes.   Otherwise normal ECG.  Read time: 1130     Imaging:  Radiographic findings were communicated with the patient and family who voiced understanding of the findings.    Chest XR, PA & LAT:  Right-sided Port-A-Cath. The lungs are clear. No  pneumothorax or pleural effusion.  As per radiology.    Laboratory:  1139 Troponin: <0.015    1138 Lactic acid: 4.6 (HH)  1404 Lactic acid: 3.0 (H)    CBC: WBC: 24.8 (H), HGB: 5.7 (LL), PLT: 91 (L)     CMP: Glucose 207 (H), Chloride 110 (H), Urea Nitrogen 43 (H), Calcium 8.1 (L), Albumin 2.5 (L), Protein total 5.6 (L), Alkaline Phosphatase 153 (H), o/w WNL (Creatinine: 0.99)    INR: 1.27 (H)    ABO: A  RH: Positive     Occult blood stool: Positive    2 units of blood ordered    UA with micro: pending    Blood culture: pending     Interventions:  1141 NS 1L IV BOLUS  1241 Protonix 40 mg IV  Heplock  Oxygen by nasal cannula at 2L/min  2 units PRBCs IV  1405 Lactated ringers BOLUS 1000 mL IV  1508 Unasyn 3g IV  Lactated ringers infusion at 100 ml/hr    Emergency Department Course:  Nursing notes and vitals reviewed. 1110 I performed an exam of the patient as documented above.     IV inserted. Medicine administered as documented above. Blood drawn. This was sent to the lab for further testing, results above.    1130 EKG obtained in the ED, see results above.      Patient placed on cardiac/Sp02 monitoring.     1233 I rechecked the patient and discussed the results of his workup thus far.     1325 I spoke with ARLENE Beard, regarding the patient.     1333 I spoke with Frida Dallas PA-C, for Dr. Estrada, hospitalist, who agreed to admit the patient.     The patient was sent for a Chest XR, PA & LAT while in the emergency department, findings above.     1429 The patient refused second blood draw for second blood culture.     1442 I rechecked the patient and discussed the results of his workup thus far.     1510 I rechecked the patient. Large melena. Occult blood  stool ordered.   /63   Pulse 82   Temp 98.5  F (36.9  C) (Oral)   Resp 17   SpO2 100%     1514 I spoke with Frida Dallas PA-C, regarding the patient.     Findings and plan explained to the Patient and spouse who consents to admission. Discussed the patient with Frida Dallas PA-C, for Dr. Estrada, who will admit the patient to a tele bed for further monitoring, evaluation, and treatment.     Discussed results with patient.  Gave patient copies of all results (applicable labs, CT scans and/or ultrasounds).  Answered questions.    Impression & Plan      CMS Diagnoses: The Lactic acid level is elevated due to dehydration vs sepsis/SIRS, at this time there is no sign of severe sepsis or septic shock.    SIRS criteria:  1) temp > 38 C or < 36 C no  2) HR > 90 no  3) RR > 20, PaCO2 < 32 no  4) WBC > 12 or < 4 (bands > 10%) yes    Modified SIRS criteria:  1) temp > 38 C or < 36 C  2) HR > 90  3) RR > 20, PaCO2 < 32  4) Sp02 < 94% or MAP < 65    Medical Decision Making:  Rhett Nation is a 74 year old male who is undergoing chemotherapy from his right chest wall portacath for pancreatic cancer. He had a CT scan over the weekend, which is noted above. On 10/21, his platelets were 117, and his hemoglobin was 7.1 so he was given 2 units of PRBCs. The patient presents very tired and weak. On examination, he in very much pallor in addition to a pale conjunctiva, and we did strongly think he was anemic. An IV was placed, and labs were sent. Two units of PRBCs have been ordered as the patient's CBC has come back showing that he is anemic at a hemoglobin of 5.7, and his platelets are low at 91, which they have been in the past. His white cell count is elevated at 24,000. He is not hypotensive or tachycardic, but the lactic acid BPA did fire so we have given him IV fluids in addition to blood culture. His last WBC count was 5.3 on 5/20. Unclear if elevated WBC a reaction to chemo vs infection vs dehydration. The  patient has not yet urinated for us, and we are waiting on that. We are waiting on the chest x-ray. His EKG does not show any evidence of new STEMI. The patient also stated that he had a mild amount of rectal bleeding, which has since ceased, as well as a small amount of hematemesis. GI has been consulted, and he has been given Protonix. His BUN is elevated consistent with a GI bleed, but his creatinine is normal. Troponin does not show any signs of ischemia.     Initial lactic acid was 4.6 and is being rechecked.  I ordered 2 blood cultures and the patient refused the second blood culture via his port a cath to be accessed.  IV fluids and hydration administered (2L), plus maintenance hydration.  Blood spectrum antibiotics ordered. This has been discussed with the PA that is on with Dr. Estrada, and the plan is to admit him to a telemetry bed.   GI will consult.  Patient did have a large amount of melena ?100cc later in his ED course.  Admitting team was updated.  No sign of hemodynamically instability.    Diagnosis:    ICD-10-CM    1. Anemia, unspecified type D64.9 ABO/Rh type and screen     Occult blood stool     Blood component     Blood component     Blood component     Blood component     Blood culture ONE site     Lactic acid whole blood     Lactic acid whole blood     CANCELED: Lactic acid   2. Thrombocytopenia (H) D69.6    3. Malignant neoplasm of pancreas, unspecified location of malignancy (H) C25.9    4. Leukocytosis, unspecified type D72.829    5. Gastrointestinal hemorrhage, unspecified gastrointestinal hemorrhage type K92.2    6. Melena K92.1      Disposition:  Admitted to Dr. Estrada (tele)    Scribe Disclosure:  Sasha WOOTEN, am serving as a scribe on 10/29/2019 at 11:28 AM to personally document services performed by Mali Hoffman MD based on my observations and the provider's statements to me.      Sasha Bojorquez  10/29/2019   [facility] EMERGENCY DEPARTMENT       Mali Hoffman  MD Melinda  10/29/19 3147

## 2019-10-29 NOTE — ED NOTES
Patient refusing draw for second set of cultures. Importance of cultures was explained to patient. Still refusing at this time. MD aware.

## 2019-10-30 LAB
ABO + RH BLD: NORMAL
ABO + RH BLD: NORMAL
ALBUMIN SERPL-MCNC: 2 G/DL (ref 3.4–5)
ALP SERPL-CCNC: 100 U/L (ref 40–150)
ALT SERPL W P-5'-P-CCNC: 18 U/L (ref 0–70)
ANION GAP SERPL CALCULATED.3IONS-SCNC: 5 MMOL/L (ref 3–14)
AST SERPL W P-5'-P-CCNC: 16 U/L (ref 0–45)
BILIRUB DIRECT SERPL-MCNC: 0.2 MG/DL (ref 0–0.2)
BILIRUB SERPL-MCNC: 0.8 MG/DL (ref 0.2–1.3)
BLD GP AB SCN SERPL QL: NORMAL
BLD PROD TYP BPU: NORMAL
BLD UNIT ID BPU: 0
BLD UNIT ID BPU: 0
BLOOD BANK CMNT PATIENT-IMP: NORMAL
BLOOD PRODUCT CODE: NORMAL
BLOOD PRODUCT CODE: NORMAL
BPU ID: NORMAL
BPU ID: NORMAL
BUN SERPL-MCNC: 32 MG/DL (ref 7–30)
CALCIUM SERPL-MCNC: 7.4 MG/DL (ref 8.5–10.1)
CHLORIDE SERPL-SCNC: 116 MMOL/L (ref 94–109)
CO2 SERPL-SCNC: 25 MMOL/L (ref 20–32)
CREAT SERPL-MCNC: 1.02 MG/DL (ref 0.66–1.25)
GFR SERPL CREATININE-BSD FRML MDRD: 71 ML/MIN/{1.73_M2}
GLUCOSE BLDC GLUCOMTR-MCNC: 155 MG/DL (ref 70–99)
GLUCOSE BLDC GLUCOMTR-MCNC: 155 MG/DL (ref 70–99)
GLUCOSE BLDC GLUCOMTR-MCNC: 158 MG/DL (ref 70–99)
GLUCOSE BLDC GLUCOMTR-MCNC: 159 MG/DL (ref 70–99)
GLUCOSE BLDC GLUCOMTR-MCNC: 161 MG/DL (ref 70–99)
GLUCOSE BLDC GLUCOMTR-MCNC: 210 MG/DL (ref 70–99)
GLUCOSE SERPL-MCNC: 148 MG/DL (ref 70–99)
HGB BLD-MCNC: 7.4 G/DL (ref 13.3–17.7)
HGB BLD-MCNC: 7.9 G/DL (ref 13.3–17.7)
HGB BLD-MCNC: 7.9 G/DL (ref 13.3–17.7)
HGB BLD-MCNC: 8.2 G/DL (ref 13.3–17.7)
INTERPRETATION ECG - MUSE: NORMAL
NUM BPU REQUESTED: 4
POTASSIUM SERPL-SCNC: 3.6 MMOL/L (ref 3.4–5.3)
PROT SERPL-MCNC: 4.4 G/DL (ref 6.8–8.8)
SODIUM SERPL-SCNC: 146 MMOL/L (ref 133–144)
SPECIMEN EXP DATE BLD: NORMAL
TRANSFUSION STATUS PATIENT QL: NORMAL
UPPER GI ENDOSCOPY: NORMAL

## 2019-10-30 PROCEDURE — 85018 HEMOGLOBIN: CPT | Performed by: PHYSICIAN ASSISTANT

## 2019-10-30 PROCEDURE — 25000125 ZZHC RX 250: Performed by: INTERNAL MEDICINE

## 2019-10-30 PROCEDURE — C9113 INJ PANTOPRAZOLE SODIUM, VIA: HCPCS | Performed by: INTERNAL MEDICINE

## 2019-10-30 PROCEDURE — P9016 RBC LEUKOCYTES REDUCED: HCPCS | Performed by: EMERGENCY MEDICINE

## 2019-10-30 PROCEDURE — 25000132 ZZH RX MED GY IP 250 OP 250 PS 637: Performed by: INTERNAL MEDICINE

## 2019-10-30 PROCEDURE — 25800030 ZZH RX IP 258 OP 636: Performed by: PHYSICIAN ASSISTANT

## 2019-10-30 PROCEDURE — 25000128 H RX IP 250 OP 636: Performed by: PHYSICIAN ASSISTANT

## 2019-10-30 PROCEDURE — 85018 HEMOGLOBIN: CPT | Performed by: INTERNAL MEDICINE

## 2019-10-30 PROCEDURE — 25000128 H RX IP 250 OP 636: Performed by: INTERNAL MEDICINE

## 2019-10-30 PROCEDURE — 0DJ08ZZ INSPECTION OF UPPER INTESTINAL TRACT, VIA NATURAL OR ARTIFICIAL OPENING ENDOSCOPIC: ICD-10-PCS | Performed by: INTERNAL MEDICINE

## 2019-10-30 PROCEDURE — C9113 INJ PANTOPRAZOLE SODIUM, VIA: HCPCS | Performed by: PHYSICIAN ASSISTANT

## 2019-10-30 PROCEDURE — 43235 EGD DIAGNOSTIC BRUSH WASH: CPT | Performed by: INTERNAL MEDICINE

## 2019-10-30 PROCEDURE — 00000146 ZZHCL STATISTIC GLUCOSE BY METER IP

## 2019-10-30 PROCEDURE — 25800030 ZZH RX IP 258 OP 636: Performed by: INTERNAL MEDICINE

## 2019-10-30 PROCEDURE — 36415 COLL VENOUS BLD VENIPUNCTURE: CPT | Performed by: PHYSICIAN ASSISTANT

## 2019-10-30 PROCEDURE — 12000000 ZZH R&B MED SURG/OB

## 2019-10-30 PROCEDURE — 36415 COLL VENOUS BLD VENIPUNCTURE: CPT | Performed by: INTERNAL MEDICINE

## 2019-10-30 PROCEDURE — 80048 BASIC METABOLIC PNL TOTAL CA: CPT | Performed by: PHYSICIAN ASSISTANT

## 2019-10-30 PROCEDURE — 80076 HEPATIC FUNCTION PANEL: CPT | Performed by: PHYSICIAN ASSISTANT

## 2019-10-30 PROCEDURE — 99232 SBSQ HOSP IP/OBS MODERATE 35: CPT | Performed by: INTERNAL MEDICINE

## 2019-10-30 PROCEDURE — 40000104 ZZH STATISTIC MODERATE SEDATION < 10 MIN: Performed by: INTERNAL MEDICINE

## 2019-10-30 RX ORDER — LIDOCAINE 40 MG/G
CREAM TOPICAL
Status: DISCONTINUED | OUTPATIENT
Start: 2019-10-30 | End: 2019-10-31 | Stop reason: HOSPADM

## 2019-10-30 RX ORDER — FLUMAZENIL 0.1 MG/ML
0.2 INJECTION, SOLUTION INTRAVENOUS
Status: ACTIVE | OUTPATIENT
Start: 2019-10-30 | End: 2019-10-30

## 2019-10-30 RX ORDER — SODIUM CHLORIDE, SODIUM LACTATE, POTASSIUM CHLORIDE, CALCIUM CHLORIDE 600; 310; 30; 20 MG/100ML; MG/100ML; MG/100ML; MG/100ML
1000 INJECTION, SOLUTION INTRAVENOUS CONTINUOUS
Status: DISCONTINUED | OUTPATIENT
Start: 2019-10-30 | End: 2019-10-31

## 2019-10-30 RX ORDER — OMEPRAZOLE 20 MG/1
TABLET, DELAYED RELEASE ORAL
Qty: 60 TABLET | Refills: 1 | Status: SHIPPED | OUTPATIENT
Start: 2019-10-30 | End: 2019-10-31

## 2019-10-30 RX ORDER — FENTANYL CITRATE 50 UG/ML
INJECTION, SOLUTION INTRAMUSCULAR; INTRAVENOUS PRN
Status: DISCONTINUED | OUTPATIENT
Start: 2019-10-30 | End: 2019-10-30 | Stop reason: HOSPADM

## 2019-10-30 RX ORDER — NALOXONE HYDROCHLORIDE 0.4 MG/ML
.1-.4 INJECTION, SOLUTION INTRAMUSCULAR; INTRAVENOUS; SUBCUTANEOUS
Status: ACTIVE | OUTPATIENT
Start: 2019-10-30 | End: 2019-10-31

## 2019-10-30 RX ADMIN — INSULIN ASPART 1 UNITS: 100 INJECTION, SOLUTION INTRAVENOUS; SUBCUTANEOUS at 08:57

## 2019-10-30 RX ADMIN — GABAPENTIN 300 MG: 300 CAPSULE ORAL at 12:02

## 2019-10-30 RX ADMIN — ONDANSETRON HYDROCHLORIDE 4 MG: 2 INJECTION, SOLUTION INTRAMUSCULAR; INTRAVENOUS at 08:42

## 2019-10-30 RX ADMIN — INSULIN ASPART 1 UNITS: 100 INJECTION, SOLUTION INTRAVENOUS; SUBCUTANEOUS at 01:33

## 2019-10-30 RX ADMIN — PANTOPRAZOLE SODIUM 8 MG/HR: 40 INJECTION, POWDER, FOR SOLUTION INTRAVENOUS at 03:48

## 2019-10-30 RX ADMIN — ATORVASTATIN CALCIUM 20 MG: 20 TABLET, FILM COATED ORAL at 12:02

## 2019-10-30 RX ADMIN — ONDANSETRON 4 MG: 4 TABLET, ORALLY DISINTEGRATING ORAL at 16:24

## 2019-10-30 RX ADMIN — INSULIN ASPART 1 UNITS: 100 INJECTION, SOLUTION INTRAVENOUS; SUBCUTANEOUS at 12:32

## 2019-10-30 RX ADMIN — PANTOPRAZOLE SODIUM 8 MG/HR: 40 INJECTION, POWDER, FOR SOLUTION INTRAVENOUS at 14:52

## 2019-10-30 RX ADMIN — INSULIN ASPART 2 UNITS: 100 INJECTION, SOLUTION INTRAVENOUS; SUBCUTANEOUS at 21:33

## 2019-10-30 RX ADMIN — INSULIN ASPART 1 UNITS: 100 INJECTION, SOLUTION INTRAVENOUS; SUBCUTANEOUS at 04:49

## 2019-10-30 RX ADMIN — GABAPENTIN 300 MG: 300 CAPSULE ORAL at 16:17

## 2019-10-30 RX ADMIN — GABAPENTIN 300 MG: 300 CAPSULE ORAL at 21:36

## 2019-10-30 RX ADMIN — INSULIN ASPART 1 UNITS: 100 INJECTION, SOLUTION INTRAVENOUS; SUBCUTANEOUS at 16:51

## 2019-10-30 ASSESSMENT — ACTIVITIES OF DAILY LIVING (ADL)
ADLS_ACUITY_SCORE: 23
ADLS_ACUITY_SCORE: 19

## 2019-10-30 NOTE — PLAN OF CARE
A&O X4. VSS on RA; weaned from 3L. Tele SR. Assist of 1 w/ walker. NPO for possibly procedure today. PIV X2. Protonix @ 10mL/hr. IVF V@ 100mL/hr. Hemoglobin 5.9. MD notified and 2 units of RBC given; recheck after infusion or AM labs. No hemoptysis or emesis, or  BM this shift.   and 155; sliding scale coverage given. BLE edema 2+/3+. Wound LLE; dressing CDI. GI consulted. Continue to monitor.

## 2019-10-30 NOTE — PLAN OF CARE
Pt admitted with GI bleed.  VSS.  Went for EGD, found nonbleeding ulcer. Since EDG pt has had 2-3 maroon colored stools.  MD notified.  Hgb checks ordered.  Denies pain, reports nausea better received zofran x1.  On clear liquid diet. Ambulates stand by assist.

## 2019-10-30 NOTE — OR NURSING
Patient tolerated the p[rocedure well. Report called to patients primary RN.patient in recovery in a stable condition.

## 2019-10-30 NOTE — PROVIDER NOTIFICATION
Paged Dr. Estrada: Pt has had 2 large liquid maroon stools, is he ok to d/c or should we check another hgb later?

## 2019-10-30 NOTE — CONSULTS
GASTROENTEROLOGY CONSULTATION      Rhett A Patch  [address] [id number]  74 year old male     Admission Date/Time: 10/29/2019  Primary Care Provider: Prince Alexander  Referring / Attending Physician: Dr. Estrada     We were asked to see the patient in consultation by Dr. Estrada for evaluation of melena.        HPI:  Rhett Nation is a 74 year old male with history of pancreatic adenocarcinoma on chemotherapy, insulin-dependent diabetes, and hypertension who was admitted 10/29/19 after an episode of melena and hematemesis concerning for upper GI bleeding. This started early yesterday morning. There is no abdominal pain. Hemoglobin was 5.7 on admission, and improved to 7.9 this morning after transfusion. No further bleeding documented overnight.       PAST MEDICAL HISTORY:  Patient Active Problem List    Diagnosis Date Noted     Melena 10/29/2019     Priority: Medium     Pancreatic cancer (H) 09/12/2019     Priority: Medium          ROS: A comprehensive ten point review of systems was negative aside from those in mentioned in the HPI.       MEDICATIONS:   Prior to Admission medications    Medication Sig Start Date End Date Taking? Authorizing Provider   amLODIPine (NORVASC) 5 MG tablet Take 5 mg by mouth daily   Yes Unknown, Entered By History   aspirin 81 MG EC tablet Take 81 mg by mouth daily   Yes Unknown, Entered By History   atenolol (TENORMIN) 100 MG tablet Take 100 mg by mouth daily   Yes Unknown, Entered By History   atorvastatin (LIPITOR) 20 MG tablet Take 20 mg by mouth daily   Yes Unknown, Entered By History   furosemide (LASIX) 20 MG tablet Take 20 mg by mouth daily   Yes Unknown, Entered By History   furosemide (LASIX) 20 MG tablet Take 20 mg by mouth daily as needed Take at noon as needed for leg swelling   Yes Unknown, Entered By History   gabapentin (NEURONTIN) 100 MG capsule Take 300 mg by mouth 3 times daily    Yes Unknown, Entered By History   glipiZIDE (GLUCOTROL XL) 10 MG  "24 hr tablet Take 10 mg by mouth daily   Yes Unknown, Entered By History   insulin degludec (TRESIBA) 100 UNIT/ML pen Inject 18 Units Subcutaneous daily   Yes Unknown, Entered By History   lidocaine-prilocaine (EMLA) 2.5-2.5 % external cream Apply topically as needed For port    Yes Unknown, Entered By History   lisinopril (PRINIVIL/ZESTRIL) 20 MG tablet Take 20 mg by mouth daily   Yes Unknown, Entered By History   lisinopril-hydrochlorothiazide (PRINZIDE/ZESTORETIC) 20-12.5 MG tablet Take 1 tablet by mouth daily   Yes Unknown, Entered By History   metFORMIN (GLUCOPHAGE) 1000 MG tablet Take 1,000 mg by mouth daily with food    Yes Unknown, Entered By History   UNABLE TO FIND MEDICATION NAME: anti-diarrheal medication to take as needed   Yes Unknown, Entered By History   UNABLE TO FIND MEDICATION NAME: IV chemotherapy to be given every other week on Mondays.   Yes Unknown, Entered By History        ALLERGIES: No Known Allergies     SOCIAL HISTORY:  Social History     Tobacco Use     Smoking status: Former Smoker     Packs/day: 0.00     Smokeless tobacco: Never Used   Substance Use Topics     Alcohol use: Not Currently     Drug use: None        FAMILY HISTORY:  History reviewed. No pertinent family history.     PHYSICAL EXAM:     /72   Pulse 66   Temp 97.3  F (36.3  C) (Oral)   Resp 14   Ht 1.702 m (5' 7\")   Wt 79.4 kg (175 lb 1.6 oz)   SpO2 99%   BMI 27.42 kg/m       PHYSICAL EXAM:  GENERAL: pale  SKIN: no suspicious lesions, rashes, jaundice  HEAD: Normocephalic. Atraumatic.  NECK: Neck supple. No adenopathy.   EYES: No scleral icterus  RESPIRATORY: Good transmission. CTA bilaterally.   CARDIOVASCULAR: RRR, normal S1, S2,  No murmur appreciated  GASTROINTESTINAL: +BS, soft, non tender, non distended, no guarding/rebound  JOINT/EXTREMITIES:  no gross deformities noted, normal muscle tone  NEURO: CN 2-12 grossly intact, no focal deficits  PSYCH: Normal affect              ADDITIONAL COMMENTS:   I reviewed " the patient's new clinical lab test results.   Recent Labs   Lab Test 10/30/19  0705 10/29/19  2339 10/29/19  1139   WBC  --   --  24.8*   HGB 7.9* 5.9* 5.7*   MCV  --   --  96   PLT  --   --  91*   INR  --   --  1.27*     Recent Labs   Lab Test 10/30/19  0705 10/29/19  1139   POTASSIUM 3.6 4.0   CHLORIDE 116* 110*   CO2 25 27   BUN 32* 43*   ANIONGAP 5 7     Recent Labs   Lab Test 10/30/19  0705 10/29/19  1139   ALBUMIN 2.0* 2.5*   BILITOTAL 0.8 0.5   ALT 18 28   AST 16 21        IMAGING / ENDOSCOPY  None.       CONSULTATION ASSESSMENT AND PLAN:    Rhett Nation is a 74 year old with history of pancreatic adenocarcinoma on chemotherapy who was admitted with melena/hematemesis that started early yesterday morning. Associated anemia with hemoglobin 5.7, which improved this morning after transfusion. No further bleeding episodes have been documented overnight.    1) Upper GI bleeding        - Monitor hemoglobin and stool output.        - BID PPI.        - EGD today for further evaluation.      I discussed the patient plan with Dr. Swain. Thank you for asking us to participate in the care of this patient.    Madeleine Hannah PA-C  [facility] ([facility])

## 2019-10-30 NOTE — PLAN OF CARE
A/O  Assist x1-2/walker/belt to transfer.  Unsteady gait.    Tele SR with PACs   , 178.   VSS afebrile.    Pt refusing Lab draws, refusing to answer Profile questions.  MD aware.

## 2019-10-30 NOTE — PROGRESS NOTES
[facility]  Hospitalist Progress Note  Frank Estrada MD 10/30/2019    Reason for Stay (Diagnosis): GIB         Assessment and Plan:      Summary of Stay: Rhett Nation is a 74 year old male with known history of pancreatic adenocarcinoma (relatively recent diagnosis 4/2019), insulin-dependent diabetes mellitus, hypertension, hyperlipidemia who presented after an episode of blood in his stool and emesis early this morning concerning for Upper GI bleed.     Spoke with Dr. Hoffman in the ED, full chart review including lab work, imaging, and vital signs were reviewed. Patient received 2 units PRBC in the ED, Dr. Hoffman spoke with GI who recommended consult.   The patient was admitted to inpatient status for further work-up and monitoring.        GIB:  EGD today showed duodenal ulcer; likely bleed source.  Frequent Advil user PTA.  Ddx includes bleeding from pancreatic ca but seems less likely cause.  Was going to discharge after EGD today, but had 2 dark BM after scope so will keep another evening to make sure bleed has resolved.  Follow hgb and transfuse prn.  Clear liquid diet only today        Anemia due to acute blood loss from GIB       History of pancreatic adenocarcinoma, deemed local advanced unresectable   This is a relatively new diagnosis for the patient as he was diagnosed in April 2019 with locally advanced unresectable pancreatic adenocarcinoma.  He follows with Dr. Moore in the [facility] system recently underwent a round of chemotherapy agents include Abraxane and Gemzar this was on 10/21.   -Consider oncology consult if warranted        History of insulin-dependent type 2 diabetes mellitus   - meds on hold while PO intake reduced     Chronic bilateral lower extremity edema   The patient has been evaluated with Doppler lower extremity ultrasound without DVT 07/2019.  Takes furosemide 20 mg as needed for the leg swelling.  There is 1 small ulceration on the right medial  "lower extremity did not does not appear to celulitic in appearnace at this time  -Outline area to monitor  -Consider PT orders for lymphedema   -Hold Lasix at this time given dehydrated status        History of hypertension   Hold home oral medications        History of hyperlipidemia   Resume Lipitor 20 mg once a day        DVT Prophylaxis: Pneumatic Compression Devices  Code Status: DNR / DNI  Expected discharge: 1-2 days      Interval History (Subjective):      Had EGD today showing duodenal ulcer.  Had planned on discharge today, but unfortunately pt developed 2 dark BM post-procedure.  Plan to keep pt overnight.  Pt without complaints currently                  Physical Exam:      Last Vital Signs:  /57 (BP Location: Left arm)   Pulse 63   Temp 97  F (36.1  C) (Oral)   Resp 16   Ht 1.702 m (5' 7\")   Wt 79.4 kg (175 lb 1.6 oz)   SpO2 95%   BMI 27.42 kg/m        Intake/Output Summary (Last 24 hours) at 10/30/2019 1352  Last data filed at 10/30/2019 0700  Gross per 24 hour   Intake 2251 ml   Output 200 ml   Net 2051 ml       Constitutional: Awake, alert, cooperative, no apparent distress   Respiratory: Clear to auscultation bilaterally, no crackles or wheezing   Cardiovascular: Regular rate and rhythm, normal S1 and S2, and no murmur noted   Abdomen: Normal bowel sounds, soft, non-distended, non-tender   Skin: No rashes, no cyanosis, dry to touch   Neuro: Alert and oriented x3, no weakness, numbness, memory loss   Extremities: No edema, normal range of motion   Other(s):        All other systems: Negative          Medications:      All current medications were reviewed with changes reflected in problem list.         Data:      All new lab and imaging data was reviewed.   Labs:  Recent Labs   Lab 10/30/19  1141  10/29/19  1139   WBC  --   --  24.8*   HGB 7.9*   < > 5.7*   HCT  --   --  18.3*   MCV  --   --  96   PLT  --   --  91*    < > = values in this interval not displayed.      Imaging:   Recent " Results (from the past 24 hour(s))   XR Chest Port 1 View    Narrative    CHEST ONE VIEW PORTABLE October 29, 2019 2:05 PM     HISTORY: Chest.    COMPARISON: None.      Impression    IMPRESSION: Right-sided Port-A-Cath. The lungs are clear. No  pneumothorax or pleural effusion.    MIGUELITO ROBERTS MD

## 2019-10-30 NOTE — PROGRESS NOTES
Pt seen and examined.  I also spoke with Dr. Swain of GI.  Suspect duodenal ulcer is bleed source.  GI is OK with pt discharging today.  Pt had been taking Advil routinely PTA; likely cause of ulcer.  Advised no NSAIDs going forward    Anticipate home today    Addendum:  Pt had 2 dark BM after EGD today.  Possible this is old blood, but recommend pt remain in hospital overnight to make sure not re-bleeding.  Follow hgb and transfuse prn

## 2019-10-30 NOTE — PRE-PROCEDURE
Pre-Endoscopy History and Physical     Rhett Nation MRN# [id number]   YOB: 1945 Age: 74 year old     Date of Procedure: 10/29/2019  Primary care provider: Prince Alexander  Type of Endoscopy: esophagogastroduodenoscopy (upper GI endoscopy)  Reason for Procedure: UGI bleed  Type of Anesthesia Anticipated: Moderate (conscious) sedation    HPI:    Rhett is a 74 year old male who will be undergoing the above procedure.      A history and physical has been performed. The patient's medications and allergies have been reviewed. The risks and benefits of the procedure and the sedation options and risks were discussed with the patient.  All questions were answered and informed consent was obtained.      No Known Allergies     Current Facility-Administered Medications   Medication     atorvastatin (LIPITOR) tablet 20 mg     glucose gel 15-30 g    Or     dextrose 50 % injection 25-50 mL    Or     glucagon injection 1 mg     gabapentin (NEURONTIN) capsule 300 mg     insulin aspart (NovoLOG) inj (RAPID ACTING)     lactated ringers infusion     lidocaine (LMX4) cream     lidocaine (LMX4) cream     lidocaine (LMX4) kit     lidocaine 1 % 0.1-1 mL     lidocaine 1 % 0.1-1 mL     May continue current IV fluids if patient has IV fluids infusing.     May take regular AM medications except those listed below     melatonin tablet 1 mg     naloxone (NARCAN) injection 0.1-0.4 mg     ondansetron (ZOFRAN-ODT) ODT tab 4 mg    Or     ondansetron (ZOFRAN) injection 4 mg     pantoprazole (PROTONIX) 80 mg in sodium chloride 0.9 % 100 mL infusion     prochlorperazine (COMPAZINE) injection 5 mg    Or     prochlorperazine (COMPAZINE) tablet 5 mg    Or     prochlorperazine (COMPAZINE) Suppository 12.5 mg     sodium chloride (PF) 0.9% PF flush 3 mL     sodium chloride (PF) 0.9% PF flush 3 mL     sodium chloride (PF) 0.9% PF flush 3 mL     sodium chloride (PF) 0.9% PF flush 3 mL       Patient Active Problem List   Diagnosis      Pancreatic cancer (H)     Melena        Past Medical History:   Diagnosis Date     Diabetes (H)      Hypertension         History reviewed. No pertinent surgical history.    Social History     Tobacco Use     Smoking status: Former Smoker     Packs/day: 0.00     Smokeless tobacco: Never Used   Substance Use Topics     Alcohol use: Not Currently       History reviewed. No pertinent family history.         Medications:     Medications Prior to Admission   Medication Sig Dispense Refill Last Dose     amLODIPine (NORVASC) 5 MG tablet Take 5 mg by mouth daily        aspirin 81 MG EC tablet Take 81 mg by mouth daily   10/28/2019 at Unknown time     atenolol (TENORMIN) 100 MG tablet Take 100 mg by mouth daily        atorvastatin (LIPITOR) 20 MG tablet Take 20 mg by mouth daily   10/28/2019 at Unknown time     furosemide (LASIX) 20 MG tablet Take 20 mg by mouth daily   10/28/2019 at Unknown time     furosemide (LASIX) 20 MG tablet Take 20 mg by mouth daily as needed Take at noon as needed for leg swelling    at prn     gabapentin (NEURONTIN) 100 MG capsule Take 300 mg by mouth 3 times daily    Past Week at Unknown time     glipiZIDE (GLUCOTROL XL) 10 MG 24 hr tablet Take 10 mg by mouth daily   10/28/2019 at Unknown time     insulin degludec (TRESIBA) 100 UNIT/ML pen Inject 18 Units Subcutaneous daily   10/28/2019 at am     lidocaine-prilocaine (EMLA) 2.5-2.5 % external cream Apply topically as needed For port     at prn     lisinopril (PRINIVIL/ZESTRIL) 20 MG tablet Take 20 mg by mouth daily   Past Week at Unknown time     lisinopril-hydrochlorothiazide (PRINZIDE/ZESTORETIC) 20-12.5 MG tablet Take 1 tablet by mouth daily   Past Week at Unknown time     metFORMIN (GLUCOPHAGE) 1000 MG tablet Take 1,000 mg by mouth daily with food    10/28/2019 at Unknown time     UNABLE TO FIND MEDICATION NAME: anti-diarrheal medication to take as needed   10/29/2019 at Unknown time     UNABLE TO FIND MEDICATION NAME: IV chemotherapy to be  "given every other week on Mondays.   10/21/2019       Scheduled Medications:    atorvastatin  20 mg Oral Daily     gabapentin  300 mg Oral TID     insulin aspart  1-6 Units Subcutaneous Q4H     sodium chloride (PF)  3 mL Intracatheter Q8H     sodium chloride (PF)  3 mL Intracatheter Q8H       PRN:  glucose **OR** dextrose **OR** glucagon, lidocaine 4%, lidocaine, lidocaine 4%, lidocaine (buffered or not buffered), lidocaine (buffered or not buffered), - MEDICATION INSTRUCTIONS -, - MEDICATION INSTRUCTIONS -, melatonin, naloxone, ondansetron **OR** ondansetron, prochlorperazine **OR** prochlorperazine **OR** prochlorperazine, sodium chloride (PF), sodium chloride (PF)    PHYSICAL EXAM:   BP (!) 153/72   Pulse 67   Temp 97.3  F (36.3  C) (Oral)   Resp 9   Ht 1.702 m (5' 7\")   Wt 79.4 kg (175 lb 1.6 oz)   SpO2 100%   BMI 27.42 kg/m   Estimated body mass index is 27.42 kg/m  as calculated from the following:    Height as of this encounter: 1.702 m (5' 7\").    Weight as of this encounter: 79.4 kg (175 lb 1.6 oz).   RESP: lungs clear to auscultation - no rales, rhonchi or wheezes  CV: regular rates and rhythm    IMPRESSION   ASA Class 4 - Severe systemic disease that is a constant threat to life      Signed Electronically by: Tom Swain MD  October 30, 2019    .            "

## 2019-10-31 VITALS
BODY MASS INDEX: 29.18 KG/M2 | TEMPERATURE: 97.4 F | WEIGHT: 185.9 LBS | SYSTOLIC BLOOD PRESSURE: 128 MMHG | RESPIRATION RATE: 16 BRPM | HEIGHT: 67 IN | OXYGEN SATURATION: 95 % | DIASTOLIC BLOOD PRESSURE: 52 MMHG | HEART RATE: 63 BPM

## 2019-10-31 LAB
GLUCOSE BLDC GLUCOMTR-MCNC: 146 MG/DL (ref 70–99)
GLUCOSE BLDC GLUCOMTR-MCNC: 151 MG/DL (ref 70–99)
GLUCOSE BLDC GLUCOMTR-MCNC: 162 MG/DL (ref 70–99)
GLUCOSE BLDC GLUCOMTR-MCNC: 183 MG/DL (ref 70–99)
HGB BLD-MCNC: 7.6 G/DL (ref 13.3–17.7)

## 2019-10-31 PROCEDURE — 36415 COLL VENOUS BLD VENIPUNCTURE: CPT | Performed by: INTERNAL MEDICINE

## 2019-10-31 PROCEDURE — 00000146 ZZHCL STATISTIC GLUCOSE BY METER IP

## 2019-10-31 PROCEDURE — C9113 INJ PANTOPRAZOLE SODIUM, VIA: HCPCS | Performed by: INTERNAL MEDICINE

## 2019-10-31 PROCEDURE — 99238 HOSP IP/OBS DSCHRG MGMT 30/<: CPT | Performed by: INTERNAL MEDICINE

## 2019-10-31 PROCEDURE — 25800030 ZZH RX IP 258 OP 636: Performed by: INTERNAL MEDICINE

## 2019-10-31 PROCEDURE — 25000132 ZZH RX MED GY IP 250 OP 250 PS 637: Performed by: INTERNAL MEDICINE

## 2019-10-31 PROCEDURE — 25000128 H RX IP 250 OP 636: Performed by: INTERNAL MEDICINE

## 2019-10-31 PROCEDURE — 85018 HEMOGLOBIN: CPT | Performed by: INTERNAL MEDICINE

## 2019-10-31 RX ORDER — PANTOPRAZOLE SODIUM 40 MG/1
40 TABLET, DELAYED RELEASE ORAL
Status: DISCONTINUED | OUTPATIENT
Start: 2019-10-31 | End: 2019-10-31 | Stop reason: HOSPADM

## 2019-10-31 RX ORDER — OMEPRAZOLE 20 MG/1
TABLET, DELAYED RELEASE ORAL
Qty: 60 TABLET | Refills: 1 | Status: SHIPPED | OUTPATIENT
Start: 2019-10-31 | End: 2019-10-31

## 2019-10-31 RX ORDER — OMEPRAZOLE 20 MG/1
TABLET, DELAYED RELEASE ORAL
Qty: 60 TABLET | Refills: 3 | Status: SHIPPED | OUTPATIENT
Start: 2019-10-31

## 2019-10-31 RX ADMIN — ATORVASTATIN CALCIUM 20 MG: 20 TABLET, FILM COATED ORAL at 08:24

## 2019-10-31 RX ADMIN — INSULIN ASPART 1 UNITS: 100 INJECTION, SOLUTION INTRAVENOUS; SUBCUTANEOUS at 09:35

## 2019-10-31 RX ADMIN — INSULIN ASPART 1 UNITS: 100 INJECTION, SOLUTION INTRAVENOUS; SUBCUTANEOUS at 01:17

## 2019-10-31 RX ADMIN — PANTOPRAZOLE SODIUM 8 MG/HR: 40 INJECTION, POWDER, FOR SOLUTION INTRAVENOUS at 01:04

## 2019-10-31 RX ADMIN — PANTOPRAZOLE SODIUM 40 MG: 40 TABLET, DELAYED RELEASE ORAL at 09:35

## 2019-10-31 RX ADMIN — GABAPENTIN 300 MG: 300 CAPSULE ORAL at 08:23

## 2019-10-31 RX ADMIN — INSULIN ASPART 1 UNITS: 100 INJECTION, SOLUTION INTRAVENOUS; SUBCUTANEOUS at 06:07

## 2019-10-31 ASSESSMENT — ACTIVITIES OF DAILY LIVING (ADL)
ADLS_ACUITY_SCORE: 23

## 2019-10-31 ASSESSMENT — MIFFLIN-ST. JEOR: SCORE: 1531.87

## 2019-10-31 NOTE — DISCHARGE SUMMARY
Admit Date:     10/29/2019   Discharge Date:     10/31/2019      PRINCIPAL FINAL DIAGNOSES:   1.  Acute blood loss anemia secondary to gastrointestinal bleed.  Status post 2 units packed red blood cell transfusion this admission.  Hemoglobin stable near 7.5 the time of discharge.   2.  Upper gastrointestinal bleed.  Suspect related to duodenal ulcers seen on endoscopy.  Differential diagnosis would include blood loss through the ampulla of Vater from his underlying pancreatic cancer.   3.  History of pancreatic adenocarcinoma, felt to be locally advanced and unresectable.  Diagnosed in 04/2019.  Oncologist is through Replaced by [facility].  The patient has been treated with recent round of chemotherapy including Abraxane and Gemzar.      PAST MEDICAL HISTORY:   1.  Type 2 diabetes mellitus.  The patient was taking insulin prior to admission   2.  Insulin Glucotrol being held at time of discharge, as patient is maintained near normal blood sugars well off of his diabetic medications while in the hospital.   3.  Chronic bilateral lower extremity edema.   4.  Hypertension history.  Blood pressure remained near normal off of his antihypertensive while hospitalized, likely in part due to blood loss.  Several of his antihypertensive medications are being held at time of discharge.  See discussion below.   5.  Hyperlipidemia.   6.  DO NOT RESUSCITATE (DNR)/DO NOT INTUBATE (DNI) code status.      PRINCIPAL PROCEDURES:   1.  Gastrointestinal consultation.   2.  Chest x-ray showing no acute findings.   3.  Upper endoscopy showing 1 nonbleeding duodenal ulcer with no stigmata of bleeding and surrounding inflammation present.   4.  Two units packed red blood cell transfusion this admission.      REASON FOR ADMISSION:  Please see dictated history and physical.  In brief, Mr. Nation is a pleasant 74-year-old male with the above medical history who presented to the hospital with concerns about hematemesis and melanotic stool.  Symptoms  were consistent with upper GI bleeding.      HOSPITAL COURSE:   1.  Acute blood loss anemia secondary to upper gastrointestinal bleed:  The patient remained hemodynamically stable while hospitalized.  He received 2 units of packed red blood cell transfusion with hemoglobin stable at 7.5 after transfusion.  The patient was treated with a Protonix drip while hospitalized.  He was seen by Gastroenterology.  An EGD was performed, which showed 1 nonbleeding duodenal ulcer.  It is suspected that the bleeding source was likely the duodenal ulcer.  However, bleeding through the common bile duct from his pancreatic cancer could also be another possibility.  Bleeding appeared to resolve while hospitalized.  The patient was discharged to home on PPI therapy.  He had been taking NSAID medications regularly prior to admission, which likely caused his duodenal ulcer.  He was advised to stop these medications.      DISCHARGE MEDICATIONS:   1.  Atenolol 100 mg daily.   2.  Atorvastatin 20 mg daily.   3.  Lasix 20 mg daily.   4.  Gabapentin 300 mg 3 times a day.   5.  Emla cream applied topically to port.   6.  Metformin 1000 mg daily with food.   7.  Omeprazole 20 mg twice a day for 8 weeks, then reduce dose to 20 mg daily thereafter.      FOLLOWUP INSTRUCTIONS:   1.  See primary MD early next week.     2.  Recheck blood pressure recheck blood pressure at that visit.   3.  Recheck hemoglobin at that visit, which is 7.5 on discharge from the hospital.  His blood pressure was low normal while hospitalized due to bleeding.  You can resume atenolol but for now, do not take lisinopril or amlodipine.  Talk to your doctor at next clinic visit about when to resume these medications.   4.  Take omeprazole 20 mg twice a day for 8 weeks, then once a day thereafter.  Recommend lifelong therapy with omeprazole.   5.  Do not take aspirin, ibuprofen, Advil, Aleve or other similar NSAID medications.   3.  For now, do not take insulin or Glucotrol,  as your blood sugar was near normal while not using these medications in the hospital.  You can resume your metformin.  Check and record your blood sugar twice a day at home and bring results to your next clinic visit.  Discuss with your primary MD at that visit about when to resume your insulin and Glucotrol.   4.  We expect your stool lighten up in color over the next 24 hours.  If you continue to have black stool beyond another 1-2 days ago or develop bloody stool, return to the ER for evaluation.      I saw and examined the patient on day of discharge.         PJ FAN MD             D: 10/31/2019   T: 10/31/2019   MT: DIANNE      Name:     IVANIA PETTY   MRN:      [id number]        Account:        [id number]   :      1945           Admit Date:     10/29/2019                                  Discharge Date: 10/31/2019      Document: [id number]

## 2019-10-31 NOTE — PLAN OF CARE
Pt discharged home with wife as transport.  Discharge instructions reviewed with patient and wife, they verbalized understanding and all questions were answered.  IV and tele removed. Pt discharged with all personal belongings and discharge medication (omeprazole).  Educated regarding NSAID use and  cessation.

## 2019-10-31 NOTE — PLAN OF CARE
VS stable, denied pain. Up and walked to BR, had bloody stool x3. Hgb 7.4. Tolerated clear liquid diet. Continue to monitor.

## 2019-10-31 NOTE — PLAN OF CARE
A&O X4. VSS on RA. Tele SR. Assist of 1 w/ walker.Clear liquid diet. PIV X2. Protonix @ 10mL/hr. Hemoglobin 7.6 this AM; trending up. No hemoptysis or emesis, or BM this shift.  ; sliding scale coverage given. BLE edema 2+/3+. Wound LLE; dressing CDI. GI following. Plan to possibly go home today if hemoglobin stable. Continue to monitor.

## 2019-10-31 NOTE — PROGRESS NOTES
"GASTROENTEROLOGY PROGRESS NOTE        SUBJECTIVE: Patient feels well this morning. He did have a few dark stools after the endoscopy yesterday afternoon, but no BMs overnight or this morning. Hemoglobin is stable. No abdominal pain, tolerating diet.     OBJECTIVE:    /52 (BP Location: Left arm)   Pulse 63   Temp 97.4  F (36.3  C) (Oral)   Resp 16   Ht 1.702 m (5' 7\")   Wt 84.3 kg (185 lb 14.4 oz)   SpO2 95%   BMI 29.12 kg/m    Temp (24hrs), Av  F (36.1  C), Min:96.7  F (35.9  C), Max:97.4  F (36.3  C)    Patient Vitals for the past 72 hrs:   Weight   10/31/19 0548 84.3 kg (185 lb 14.4 oz)   10/29/19 1556 79.4 kg (175 lb 1.6 oz)       Intake/Output Summary (Last 24 hours) at 10/31/2019 0943  Last data filed at 10/31/2019 0938  Gross per 24 hour   Intake 697 ml   Output --   Net 697 ml        PHYSICAL EXAM     Constitutional: no distress  Cardiovascular: RRR, normal S1, S2, no murmur appreciated   Respiratory: good transmission, CTAB  Abdomen: nontender, nondistended  NEURO: CN 2-12 grossly intact, no focal deficits  SKIN: No jaundice        Additional Comments:  ROS, FH, SH: See initial GI consult for details.     I have reviewed the patient's new clinical lab results:     Recent Labs   Lab Test 10/31/19  0630 10/30/19  2223 10/30/19  1453  10/29/19  1139   WBC  --   --   --   --  24.8*   HGB 7.6* 7.4* 8.2*   < > 5.7*   MCV  --   --   --   --  96   PLT  --   --   --   --  91*   INR  --   --   --   --  1.27*    < > = values in this interval not displayed.     Recent Labs   Lab Test 10/30/19  0705 10/29/19  1139   POTASSIUM 3.6 4.0   CHLORIDE 116* 110*   CO2 25 27   BUN 32* 43*   ANIONGAP 5 7     Recent Labs   Lab Test 10/30/19  0705 10/29/19  1139   ALBUMIN 2.0* 2.5*   BILITOTAL 0.8 0.5   ALT 18 28   AST 16 21        ASSESSMENT/ PLAN  Rhett Nation is a 74 year old with history of pancreatic adenocarcinoma on chemotherapy who was admitted with melena/hematemesis that started 2 days ago. Associated " anemia with hemoglobin 5.7, which improved after transfusion and has remained stable. EGD revealed nonbleeding duodenal ulcer, which may have been the bleeding source, although bleeding from the pancreas could not be excluded. Patient admits to frequent NSAID use prior to admission.     1) Upper GI bleeding        - No further overt bleeding overnight or this morning; hemoglobin stable.        - Diet as tolerated.        - Recommend discharge on pantoprazole 40 mg BID x 8 weeks, then can decrease to once daily dosing.        - Avoid NSAIDs.        - GI will be available PRN.      Madeleine Hannah PA-C  [facility] ([facility])

## 2019-11-04 LAB
BACTERIA SPEC CULT: NO GROWTH
Lab: NORMAL
SPECIMEN SOURCE: NORMAL

## 2019-11-21 ENCOUNTER — TRANSFERRED RECORDS (OUTPATIENT)
Dept: HEALTH INFORMATION MANAGEMENT | Facility: CLINIC | Age: 74
End: 2019-11-21

## 2019-12-03 LAB
ALBUMIN (URINE) MG/SPEC: 11.5 MG/DL
ALBUMIN/CREATININE RATIO: 33 MG/G
CREAT SERPL-MCNC: 0.9 MG/DL (ref 0.73–1.18)
CREATININE (URINE): 35 MG/DL
GFR SERPL CREATININE-BSD FRML MDRD: >60 ML/MIN/1.73M2
HBA1C MFR BLD: 7.8 % (ref 0–5.7)
POTASSIUM SERPL-SCNC: 4.3 MMOL/L (ref 3.5–5.1)

## 2019-12-23 LAB
CREAT SERPL-MCNC: 0.9 MG/DL (ref 0.73–1.18)
GFR SERPL CREATININE-BSD FRML MDRD: >60 ML/MIN/1.73M2
POTASSIUM SERPL-SCNC: 4.4 MMOL/L (ref 3.5–5.1)

## 2019-12-31 ENCOUNTER — TRANSFERRED RECORDS (OUTPATIENT)
Dept: HEALTH INFORMATION MANAGEMENT | Facility: CLINIC | Age: 74
End: 2019-12-31

## 2020-01-03 RX ORDER — HEPARIN SODIUM,PORCINE 10 UNIT/ML
5 VIAL (ML) INTRAVENOUS
Status: CANCELLED | OUTPATIENT
Start: 2020-01-03

## 2020-01-03 RX ORDER — HEPARIN SODIUM (PORCINE) LOCK FLUSH IV SOLN 100 UNIT/ML 100 UNIT/ML
5 SOLUTION INTRAVENOUS
Status: CANCELLED | OUTPATIENT
Start: 2020-01-03

## 2020-01-08 ENCOUNTER — HOSPITAL ENCOUNTER (OUTPATIENT)
Dept: LAB | Facility: CLINIC | Age: 75
Discharge: HOME | End: 2020-01-08
Attending: INTERNAL MEDICINE | Admitting: INTERNAL MEDICINE
Payer: COMMERCIAL

## 2020-01-08 ENCOUNTER — INFUSION THERAPY VISIT (OUTPATIENT)
Dept: INFUSION THERAPY | Facility: CLINIC | Age: 75
End: 2020-01-08
Attending: INTERNAL MEDICINE
Payer: COMMERCIAL

## 2020-01-08 VITALS
TEMPERATURE: 97.4 F | HEART RATE: 61 BPM | OXYGEN SATURATION: 100 % | RESPIRATION RATE: 18 BRPM | SYSTOLIC BLOOD PRESSURE: 182 MMHG | DIASTOLIC BLOOD PRESSURE: 82 MMHG

## 2020-01-08 DIAGNOSIS — C25.9 PANCREATIC CANCER (H): ICD-10-CM

## 2020-01-08 DIAGNOSIS — K92.1 MELENA: ICD-10-CM

## 2020-01-08 DIAGNOSIS — C25.9 PANCREATIC CANCER (H): Primary | ICD-10-CM

## 2020-01-08 PROCEDURE — 96374 THER/PROPH/DIAG INJ IV PUSH: CPT

## 2020-01-08 PROCEDURE — 86900 BLOOD TYPING SEROLOGIC ABO: CPT | Performed by: INTERNAL MEDICINE

## 2020-01-08 PROCEDURE — 86901 BLOOD TYPING SEROLOGIC RH(D): CPT | Performed by: INTERNAL MEDICINE

## 2020-01-08 PROCEDURE — 86923 COMPATIBILITY TEST ELECTRIC: CPT | Performed by: INTERNAL MEDICINE

## 2020-01-08 PROCEDURE — 25000128 H RX IP 250 OP 636: Performed by: INTERNAL MEDICINE

## 2020-01-08 PROCEDURE — 36415 COLL VENOUS BLD VENIPUNCTURE: CPT | Performed by: INTERNAL MEDICINE

## 2020-01-08 PROCEDURE — 86850 RBC ANTIBODY SCREEN: CPT | Performed by: INTERNAL MEDICINE

## 2020-01-08 RX ORDER — FUROSEMIDE 10 MG/ML
20 INJECTION INTRAMUSCULAR; INTRAVENOUS ONCE
Status: COMPLETED | OUTPATIENT
Start: 2020-01-08 | End: 2020-01-08

## 2020-01-08 RX ORDER — FUROSEMIDE 10 MG/ML
20 INJECTION INTRAMUSCULAR; INTRAVENOUS ONCE
Status: CANCELLED
Start: 2020-01-08

## 2020-01-08 RX ADMIN — FUROSEMIDE 20 MG: 10 INJECTION, SOLUTION INTRAMUSCULAR; INTRAVENOUS at 13:32

## 2020-01-08 NOTE — PROGRESS NOTES
Infusion Nursing Note:  Rhett Nation presents today for 1 unit packed red blood cells. NOT GIVEN due to elevated BP. IV lasix given.   Patient seen by provider today: No   present during visit today: Not Applicable.    Note: Pt BP upon arrive 180's/90's. Patient reports he didn't take any of his medications including Lasix and Atenolol this morning. Dr. Moore notified.     TORB 1/8/20 at 1315 per Dr. Oscar MD/Yvette Montez RN   -Give 20 mg IV Lasix now and recheck BP  -Call provider when SBP <160 and will assess if we can proceed with transfusion.     Patient SBP still 180's 50 minutes after IV lasix given. Dr. Moore office called.     TORB 1/8/20 at 1430 per Dr. Oscar MD/Yvette Montez RN  -Reschedule blood transfusion for tomorrow.  -Instruct patient to take his daily medications in the am as ordered.  -Call clinic prior to proceeding with transfusion if SBP>160. Triage line [phone]     Nurse instructed patient and family member to take meds tomorrow morning by 0700 as ordered and he verbalized understanding. Patient states his BP is always elevated in the afternoon.     Intravenous Access:  Peripheral IV placed.    Treatment Conditions:  Blood transfusion consent signed 9/13/19.  Per Dr. Moore's clinic-Hgb 7.9 on 1/7/20      Post Infusion Assessment:  PIV dc'd.   Site patent and intact, free from redness, edema or discomfort.  Access discontinued per protocol.       Discharge Plan:   Patient will return 1/9 at 0800 for rescheduled blood transfusion.  Patient discharged in stable condition accompanied by: family.  Departure Mode: Ambulatory.    Yvette Montez, RN

## 2020-01-09 ENCOUNTER — INFUSION THERAPY VISIT (OUTPATIENT)
Dept: INFUSION THERAPY | Facility: CLINIC | Age: 75
End: 2020-01-09
Attending: INTERNAL MEDICINE
Payer: COMMERCIAL

## 2020-01-09 VITALS
SYSTOLIC BLOOD PRESSURE: 191 MMHG | TEMPERATURE: 96.5 F | HEART RATE: 57 BPM | RESPIRATION RATE: 16 BRPM | DIASTOLIC BLOOD PRESSURE: 94 MMHG | OXYGEN SATURATION: 99 %

## 2020-01-09 DIAGNOSIS — C25.9 PANCREATIC CANCER (H): Primary | ICD-10-CM

## 2020-01-09 PROCEDURE — 96374 THER/PROPH/DIAG INJ IV PUSH: CPT

## 2020-01-09 PROCEDURE — P9016 RBC LEUKOCYTES REDUCED: HCPCS | Performed by: INTERNAL MEDICINE

## 2020-01-09 PROCEDURE — 25000128 H RX IP 250 OP 636: Performed by: INTERNAL MEDICINE

## 2020-01-09 PROCEDURE — 36430 TRANSFUSION BLD/BLD COMPNT: CPT

## 2020-01-09 RX ORDER — HEPARIN SODIUM,PORCINE 10 UNIT/ML
5 VIAL (ML) INTRAVENOUS
Status: DISCONTINUED | OUTPATIENT
Start: 2020-01-09 | End: 2020-01-09 | Stop reason: HOSPADM

## 2020-01-09 RX ORDER — HEPARIN SODIUM (PORCINE) LOCK FLUSH IV SOLN 100 UNIT/ML 100 UNIT/ML
5 SOLUTION INTRAVENOUS
Status: CANCELLED | OUTPATIENT
Start: 2020-01-09

## 2020-01-09 RX ORDER — HEPARIN SODIUM (PORCINE) LOCK FLUSH IV SOLN 100 UNIT/ML 100 UNIT/ML
5 SOLUTION INTRAVENOUS
Status: DISCONTINUED | OUTPATIENT
Start: 2020-01-09 | End: 2020-01-09 | Stop reason: HOSPADM

## 2020-01-09 RX ORDER — FUROSEMIDE 10 MG/ML
20 INJECTION INTRAMUSCULAR; INTRAVENOUS ONCE
Status: COMPLETED | OUTPATIENT
Start: 2020-01-09 | End: 2020-01-09

## 2020-01-09 RX ORDER — HEPARIN SODIUM,PORCINE 10 UNIT/ML
5 VIAL (ML) INTRAVENOUS
Status: CANCELLED | OUTPATIENT
Start: 2020-01-09

## 2020-01-09 RX ADMIN — FUROSEMIDE 20 MG: 10 INJECTION, SOLUTION INTRAMUSCULAR; INTRAVENOUS at 09:12

## 2020-01-09 NOTE — PROGRESS NOTES
Infusion Nursing Note:  Rhett Nation presents today for 1 unit prbc.    Patient seen by provider today: No   present during visit today: Not Applicable.    Note:/90 on arrival. 25 minutes later, 175/86.  Took all meds except lasix at home this morning.  Call to Dr Hurd with update.  Aware he did not take lasix at home today.  TORB from Dr Hurd: Give Lasix 20 mg IV, may proceed with transfusion./ Macario VÁSQUEZ    Intravenous Access:  Peripheral IV placed.    Treatment Conditions:  Blood transfusion consent signed 9/13/19    Post Infusion Assessment:  Patient tolerated infusion without incident.  BPs remain up in 190s/80-90s.  Dr Hurd notified of BP. She recommended pt go to ED for BP control.  Pt was not interested in going to ED-has (not eaten today and knows he won't get to eat in ED and most likely would be there quite awhile). Pt suggested he f/u with Dr Prince Alexander who he has already been working with re: BP control, states he will contact today and has appt with him on 1/13.  Reiterated to pt he needs to follow through with that plan and contact MD today.  Reviewed s/s of stroke and to go to ED immediately if develops any symptoms. This RN did contact RN at Dr Prince Alexander's office and shared report with her.  She will attempt to contact pt today for f/u.    Site patent and intact, free from redness, edema or discomfort.  No evidence of extravasations.  Access discontinued per protocol.       Discharge Plan:   Discharge instructions reviewed with: Family.  Patient and/or family verbalized understanding of discharge instructions and all questions answered.  Patient discharged in stable condition accompanied by: self and wife.  Departure Mode: Ambulatory.    Gina Dao RN

## (undated) DEVICE — KIT ENDO TURNOVER/PROCEDURE W/CLEAN A SCOPE LINERS 103888

## (undated) DEVICE — ENDO BITE BLOCK ADULT OLYMPUS LATEX FREE MAJ-1632

## (undated) RX ORDER — FENTANYL CITRATE 50 UG/ML
INJECTION, SOLUTION INTRAMUSCULAR; INTRAVENOUS
Status: DISPENSED
Start: 2019-10-30